# Patient Record
Sex: FEMALE | Race: WHITE | NOT HISPANIC OR LATINO | Employment: OTHER | URBAN - METROPOLITAN AREA
[De-identification: names, ages, dates, MRNs, and addresses within clinical notes are randomized per-mention and may not be internally consistent; named-entity substitution may affect disease eponyms.]

---

## 2023-10-23 PROBLEM — E55.9 VITAMIN D DEFICIENCY: Status: ACTIVE | Noted: 2023-10-23

## 2023-10-23 PROBLEM — I05.9 MITRAL VALVE DISEASE: Status: ACTIVE | Noted: 2023-10-23

## 2023-10-23 PROBLEM — R73.9 HYPERGLYCEMIA: Status: ACTIVE | Noted: 2023-10-23

## 2023-10-23 PROBLEM — M10.9 URIC ACID ARTHROPATHY: Status: ACTIVE | Noted: 2023-10-23

## 2023-10-23 PROBLEM — D72.819 LEUKOPENIA: Status: ACTIVE | Noted: 2023-10-23

## 2023-10-23 PROBLEM — Z95.2 S/P MVR (MITRAL VALVE REPLACEMENT): Status: ACTIVE | Noted: 2023-10-23

## 2023-10-23 PROBLEM — A49.8 PROTEUS MIRABILIS INFECTION: Status: ACTIVE | Noted: 2023-10-23

## 2023-10-23 PROBLEM — I48.91 AFIB (MULTI): Status: ACTIVE | Noted: 2023-10-23

## 2023-10-23 PROBLEM — R74.01 ELEVATED TRANSAMINASE LEVEL: Status: ACTIVE | Noted: 2023-10-23

## 2023-10-23 PROBLEM — S46.002A ROTATOR CUFF INJURY, LEFT, INITIAL ENCOUNTER: Status: ACTIVE | Noted: 2023-10-23

## 2023-10-23 PROBLEM — S92.901A CLOSED FRACTURE OF BONE OF RIGHT FOOT: Status: ACTIVE | Noted: 2023-10-23

## 2023-10-23 PROBLEM — I10 BENIGN HYPERTENSION: Status: ACTIVE | Noted: 2023-10-23

## 2023-10-23 PROBLEM — E66.3 OVERWEIGHT (BMI 25.0-29.9): Status: ACTIVE | Noted: 2023-10-23

## 2023-10-23 PROBLEM — I50.9 CHF (CONGESTIVE HEART FAILURE) (MULTI): Status: ACTIVE | Noted: 2023-10-23

## 2023-10-23 PROBLEM — D75.89 MACROCYTOSIS: Status: ACTIVE | Noted: 2023-10-23

## 2023-10-23 PROBLEM — H69.90 EUSTACHIAN TUBE DISORDER: Status: ACTIVE | Noted: 2023-10-23

## 2023-10-23 PROBLEM — E78.5 DYSLIPIDEMIA: Status: ACTIVE | Noted: 2023-10-23

## 2023-10-23 PROBLEM — D64.9 ANEMIA: Status: ACTIVE | Noted: 2023-10-23

## 2023-10-23 PROBLEM — R73.03 PREDIABETES: Status: ACTIVE | Noted: 2023-10-23

## 2023-10-23 PROBLEM — N34.2 INFECTIVE URETHRITIS: Status: ACTIVE | Noted: 2023-10-23

## 2023-10-23 PROBLEM — D70.4 CYCLICAL NEUTROPENIA (MULTI): Status: ACTIVE | Noted: 2023-10-23

## 2023-10-23 PROBLEM — D69.2 PURPURA (CMS-HCC): Status: ACTIVE | Noted: 2023-10-23

## 2023-10-23 RX ORDER — WARFARIN SODIUM 5 MG/1
1-2 TABLET ORAL
COMMUNITY
Start: 2021-01-15 | End: 2023-10-25 | Stop reason: SDUPTHER

## 2023-10-23 RX ORDER — BETAXOLOL 20 MG/1
1 TABLET, FILM COATED ORAL DAILY
COMMUNITY
Start: 2020-07-20 | End: 2023-10-25 | Stop reason: SDUPTHER

## 2023-10-23 RX ORDER — FUROSEMIDE 20 MG/1
20 TABLET ORAL DAILY
COMMUNITY

## 2023-10-25 ENCOUNTER — OFFICE VISIT (OUTPATIENT)
Dept: PRIMARY CARE | Facility: CLINIC | Age: 83
End: 2023-10-25
Payer: MEDICARE

## 2023-10-25 VITALS
BODY MASS INDEX: 26.13 KG/M2 | WEIGHT: 138.4 LBS | HEIGHT: 61 IN | SYSTOLIC BLOOD PRESSURE: 144 MMHG | HEART RATE: 73 BPM | OXYGEN SATURATION: 99 % | DIASTOLIC BLOOD PRESSURE: 68 MMHG

## 2023-10-25 DIAGNOSIS — Z00.00 HEALTHCARE MAINTENANCE: Primary | ICD-10-CM

## 2023-10-25 DIAGNOSIS — E78.5 DYSLIPIDEMIA: ICD-10-CM

## 2023-10-25 DIAGNOSIS — L03.116 CELLULITIS OF LEFT LOWER EXTREMITY: ICD-10-CM

## 2023-10-25 DIAGNOSIS — I48.91 ATRIAL FIBRILLATION, UNSPECIFIED TYPE (MULTI): ICD-10-CM

## 2023-10-25 PROCEDURE — G0008 ADMIN INFLUENZA VIRUS VAC: HCPCS | Performed by: STUDENT IN AN ORGANIZED HEALTH CARE EDUCATION/TRAINING PROGRAM

## 2023-10-25 PROCEDURE — 3078F DIAST BP <80 MM HG: CPT | Performed by: STUDENT IN AN ORGANIZED HEALTH CARE EDUCATION/TRAINING PROGRAM

## 2023-10-25 PROCEDURE — 1170F FXNL STATUS ASSESSED: CPT | Performed by: STUDENT IN AN ORGANIZED HEALTH CARE EDUCATION/TRAINING PROGRAM

## 2023-10-25 PROCEDURE — 3077F SYST BP >= 140 MM HG: CPT | Performed by: STUDENT IN AN ORGANIZED HEALTH CARE EDUCATION/TRAINING PROGRAM

## 2023-10-25 PROCEDURE — G0439 PPPS, SUBSEQ VISIT: HCPCS | Performed by: STUDENT IN AN ORGANIZED HEALTH CARE EDUCATION/TRAINING PROGRAM

## 2023-10-25 PROCEDURE — 99214 OFFICE O/P EST MOD 30 MIN: CPT | Performed by: STUDENT IN AN ORGANIZED HEALTH CARE EDUCATION/TRAINING PROGRAM

## 2023-10-25 PROCEDURE — 1036F TOBACCO NON-USER: CPT | Performed by: STUDENT IN AN ORGANIZED HEALTH CARE EDUCATION/TRAINING PROGRAM

## 2023-10-25 PROCEDURE — 90686 IIV4 VACC NO PRSV 0.5 ML IM: CPT | Performed by: STUDENT IN AN ORGANIZED HEALTH CARE EDUCATION/TRAINING PROGRAM

## 2023-10-25 PROCEDURE — 1159F MED LIST DOCD IN RCRD: CPT | Performed by: STUDENT IN AN ORGANIZED HEALTH CARE EDUCATION/TRAINING PROGRAM

## 2023-10-25 RX ORDER — AMOXICILLIN AND CLAVULANATE POTASSIUM 875; 125 MG/1; MG/1
875 TABLET, FILM COATED ORAL 2 TIMES DAILY
Qty: 20 TABLET | Refills: 0 | Status: SHIPPED | OUTPATIENT
Start: 2023-10-25 | End: 2023-11-07 | Stop reason: SDUPTHER

## 2023-10-25 RX ORDER — BETAXOLOL 20 MG/1
1 TABLET, FILM COATED ORAL DAILY
Qty: 90 TABLET | Refills: 1 | Status: SHIPPED | OUTPATIENT
Start: 2023-10-25

## 2023-10-25 RX ORDER — WARFARIN SODIUM 5 MG/1
10 TABLET ORAL ONCE
Qty: 180 TABLET | Refills: 1 | Status: SHIPPED | OUTPATIENT
Start: 2023-10-25 | End: 2023-11-07

## 2023-10-25 ASSESSMENT — PATIENT HEALTH QUESTIONNAIRE - PHQ9
2. FEELING DOWN, DEPRESSED OR HOPELESS: NOT AT ALL
SUM OF ALL RESPONSES TO PHQ9 QUESTIONS 1 AND 2: 0
1. LITTLE INTEREST OR PLEASURE IN DOING THINGS: NOT AT ALL

## 2023-10-25 ASSESSMENT — ENCOUNTER SYMPTOMS
SHORTNESS OF BREATH: 0
VOMITING: 0
EYE DISCHARGE: 0
HEMATURIA: 0
SORE THROAT: 0
CONSTIPATION: 0
DIARRHEA: 0
WHEEZING: 0
EYE PAIN: 0
LOSS OF SENSATION IN FEET: 0
COUGH: 0
ABDOMINAL PAIN: 0
DEPRESSION: 0
APPETITE CHANGE: 0
HALLUCINATIONS: 0
PALPITATIONS: 0
POLYDIPSIA: 0
FREQUENCY: 0
DYSURIA: 0
MYALGIAS: 0
OCCASIONAL FEELINGS OF UNSTEADINESS: 0
FATIGUE: 0
HEADACHES: 0
FEVER: 0
NAUSEA: 0

## 2023-10-25 ASSESSMENT — ACTIVITIES OF DAILY LIVING (ADL)
DRESSING: INDEPENDENT
DOING_HOUSEWORK: INDEPENDENT
BATHING: INDEPENDENT
GROCERY_SHOPPING: INDEPENDENT
TAKING_MEDICATION: INDEPENDENT
MANAGING_FINANCES: NEEDS ASSISTANCE

## 2023-10-25 NOTE — PROGRESS NOTES
Subjective   Reason for Visit: Kymberly Layne is an 83 y.o. female here for a Medicare Wellness visit.     Past Medical, Surgical, and Family History reviewed and updated in chart.    Reviewed all medications by prescribing practitioner or clinical pharmacist (such as prescriptions, OTCs, herbal therapies and supplements) and documented in the medical record.    HPI  Cancer:   -Menopause age: 50yo  -Colon (Age > 50): Refuses  -PAP (Age > 20): Refuses  -Breast (Age > 40): Refuses  -Lung (Age 55-80, 30 pack year, cessation within 15 years): Never smoker    Vaccination  Tetnus: Up to date; Next 2025  Shingle (Age > 50): Refuses  Pneumonia 13 & 23 (Age 65): Up to date  Flu: Due    Osteoporosis screening (Age > 59 or Fragility fracture): Refuses    Tobacco: Never smoker  Alcohol: Denies  Diabetes: Denies  Lipids: Denies  Cognitive: Denies any Cognitive deficits. No cognitive deficits noted.     Patient also has history of atrial fibrillation, hypertension, hyperlipidemia.  Has been tolerating her medications well.  Denies any adverse reaction.  Sees a Coumadin clinic for her INR checks.  Her only concern is bilateral lower extremity edema along with redness newly on her left lower extremity.  Has been there roughly 1 week.  Seems to be getting slightly worse.  Afebrile course.  Denies any trauma or falls.  However feels like it is different than her usual leg swelling.    Patient Care Team:  Cornelio Park DO as PCP - General  Cornelio Park DO as PCP - [employer] PCP     Review of Systems   Constitutional:  Negative for appetite change, fatigue and fever.   HENT:  Negative for congestion, ear discharge, ear pain, hearing loss and sore throat.    Eyes:  Negative for pain and discharge.   Respiratory:  Negative for cough, shortness of breath and wheezing.    Cardiovascular:  Positive for leg swelling. Negative for chest pain and palpitations.   Gastrointestinal:  Negative for abdominal pain, constipation,  "diarrhea, nausea and vomiting.   Endocrine: Negative for cold intolerance, heat intolerance and polydipsia.   Genitourinary:  Negative for dysuria, frequency and hematuria.   Musculoskeletal:  Negative for gait problem and myalgias.   Skin:  Negative for rash.   Neurological:  Negative for syncope and headaches.   Psychiatric/Behavioral:  Negative for hallucinations and suicidal ideas.        Objective   Vitals:  /68   Pulse 73   Ht 1.556 m (5' 1.25\")   Wt 62.8 kg (138 lb 6.4 oz)   SpO2 99%   BMI 25.94 kg/m²       Physical Exam  Constitutional:       Appearance: Normal appearance.   HENT:      Head: Normocephalic and atraumatic.      Right Ear: External ear normal.      Left Ear: External ear normal.      Nose: Nose normal.      Mouth/Throat:      Mouth: Mucous membranes are moist.   Eyes:      Extraocular Movements: Extraocular movements intact.      Conjunctiva/sclera: Conjunctivae normal.      Pupils: Pupils are equal, round, and reactive to light.   Cardiovascular:      Rate and Rhythm: Normal rate and regular rhythm.      Pulses: Normal pulses.      Heart sounds: Normal heart sounds.      Comments: Erythema and warmth noted on the left lower extremity with swelling bilaterally.  +2 pitting edema bilaterally  Pulmonary:      Effort: Pulmonary effort is normal.      Breath sounds: Normal breath sounds.   Abdominal:      General: Abdomen is flat.      Palpations: Abdomen is soft.   Neurological:      General: No focal deficit present.      Mental Status: She is alert and oriented to person, place, and time.   Psychiatric:         Mood and Affect: Mood normal.         Behavior: Behavior normal.         Assessment/Plan   Preventative screenings as stated above.  We will review blood work once received.  States that she did in [facility].  We will have her sign a release form today.  Continue current medication regimen.  Start the patient on oral antibiotics.  Compression stockings.  If not " better by next week to return for reevaluation.  Problem List Items Addressed This Visit    None

## 2023-11-07 ENCOUNTER — OFFICE VISIT (OUTPATIENT)
Dept: PRIMARY CARE | Facility: CLINIC | Age: 83
End: 2023-11-07
Payer: MEDICARE

## 2023-11-07 VITALS
OXYGEN SATURATION: 95 % | DIASTOLIC BLOOD PRESSURE: 68 MMHG | HEART RATE: 74 BPM | HEIGHT: 61 IN | BODY MASS INDEX: 26.55 KG/M2 | SYSTOLIC BLOOD PRESSURE: 122 MMHG | WEIGHT: 140.6 LBS

## 2023-11-07 DIAGNOSIS — L03.116 CELLULITIS OF LEFT LOWER EXTREMITY: ICD-10-CM

## 2023-11-07 PROCEDURE — 3078F DIAST BP <80 MM HG: CPT | Performed by: STUDENT IN AN ORGANIZED HEALTH CARE EDUCATION/TRAINING PROGRAM

## 2023-11-07 PROCEDURE — 3074F SYST BP LT 130 MM HG: CPT | Performed by: STUDENT IN AN ORGANIZED HEALTH CARE EDUCATION/TRAINING PROGRAM

## 2023-11-07 PROCEDURE — 1159F MED LIST DOCD IN RCRD: CPT | Performed by: STUDENT IN AN ORGANIZED HEALTH CARE EDUCATION/TRAINING PROGRAM

## 2023-11-07 PROCEDURE — 99213 OFFICE O/P EST LOW 20 MIN: CPT | Performed by: STUDENT IN AN ORGANIZED HEALTH CARE EDUCATION/TRAINING PROGRAM

## 2023-11-07 PROCEDURE — 1036F TOBACCO NON-USER: CPT | Performed by: STUDENT IN AN ORGANIZED HEALTH CARE EDUCATION/TRAINING PROGRAM

## 2023-11-07 RX ORDER — AMOXICILLIN AND CLAVULANATE POTASSIUM 875; 125 MG/1; MG/1
875 TABLET, FILM COATED ORAL 2 TIMES DAILY
Qty: 28 TABLET | Refills: 0 | Status: SHIPPED | OUTPATIENT
Start: 2023-11-07 | End: 2023-11-21

## 2023-11-07 ASSESSMENT — ENCOUNTER SYMPTOMS
VOMITING: 0
NAUSEA: 0
APPETITE CHANGE: 0
PALPITATIONS: 0
HEMATURIA: 0
ABDOMINAL PAIN: 0
WHEEZING: 0
HALLUCINATIONS: 0
FATIGUE: 0
DIARRHEA: 0
FREQUENCY: 0
FEVER: 0
SHORTNESS OF BREATH: 0
EYE PAIN: 0
SORE THROAT: 0
MYALGIAS: 0
CONSTIPATION: 0
DYSURIA: 0
EYE DISCHARGE: 0
HEADACHES: 0
POLYDIPSIA: 0
COUGH: 0

## 2023-11-07 NOTE — PROGRESS NOTES
"Subjective   Patient ID: Kymberly Layne is a 83 y.o. female who presents for recheck left leg.    HPI   Patient here for follow-up of cellulitis of left lower extremity.  Patient and daughter is present for the visit and states that it does look significant better.  Daughter does present pictures of initial onset.  States that it did look significantly worse.  Has been using a moisturizer on her leg as well.  Completely out of antibiotics at this point in time.  No other concerns today.  Afebrile course.  Denies any chest pain or shortness of breath.  Patient does have some concerns because it still remains erythematous.    Review of Systems   Constitutional:  Negative for appetite change, fatigue and fever.   HENT:  Negative for congestion, ear discharge, ear pain, hearing loss and sore throat.    Eyes:  Negative for pain and discharge.   Respiratory:  Negative for cough, shortness of breath and wheezing.    Cardiovascular:  Negative for chest pain, palpitations and leg swelling.   Gastrointestinal:  Negative for abdominal pain, constipation, diarrhea, nausea and vomiting.   Endocrine: Negative for cold intolerance, heat intolerance and polydipsia.   Genitourinary:  Negative for dysuria, frequency and hematuria.   Musculoskeletal:  Negative for gait problem and myalgias.   Skin:  Positive for rash.   Neurological:  Negative for syncope and headaches.   Psychiatric/Behavioral:  Negative for hallucinations and suicidal ideas.        Objective   /68   Pulse 74   Ht 1.549 m (5' 1\")   Wt 63.8 kg (140 lb 9.6 oz)   SpO2 95%   BMI 26.57 kg/m²     Physical Exam  Constitutional:       Appearance: Normal appearance.   HENT:      Head: Normocephalic and atraumatic.      Right Ear: External ear normal.      Left Ear: External ear normal.      Nose: Nose normal.      Mouth/Throat:      Mouth: Mucous membranes are moist.   Eyes:      Extraocular Movements: Extraocular movements intact.      Conjunctiva/sclera: " Conjunctivae normal.      Pupils: Pupils are equal, round, and reactive to light.   Cardiovascular:      Rate and Rhythm: Normal rate and regular rhythm.      Pulses: Normal pulses.      Heart sounds: Normal heart sounds.   Pulmonary:      Effort: Pulmonary effort is normal.      Breath sounds: Normal breath sounds.   Abdominal:      General: Abdomen is flat.      Palpations: Abdomen is soft.   Skin:     Comments: Mildly warm erythematous lower left lower extremity..  Nontender to the touch.  Mild 2+ edema of the left lower extremity.   Neurological:      General: No focal deficit present.      Mental Status: She is alert and oriented to person, place, and time.   Psychiatric:         Mood and Affect: Mood normal.         Behavior: Behavior normal.         Assessment/Plan   Looks significantly improved since previously seen.  Comparatively to the image given by the daughter does look a lot better.  However is still remains erythematous.  We will continue another 2 weeks of the current antibiotic regimen.  We will follow-up shortly after for resolution.

## 2023-11-21 ENCOUNTER — TELEPHONE (OUTPATIENT)
Dept: PRIMARY CARE | Facility: CLINIC | Age: 83
End: 2023-11-21
Payer: MEDICARE

## 2023-11-21 DIAGNOSIS — R09.89 OTHER SPECIFIED SYMPTOMS AND SIGNS INVOLVING THE CIRCULATORY AND RESPIRATORY SYSTEMS: ICD-10-CM

## 2023-11-21 DIAGNOSIS — M79.89 LEG SWELLING: Primary | ICD-10-CM

## 2023-11-22 DIAGNOSIS — M79.89 LEG SWELLING: Primary | ICD-10-CM

## 2023-11-22 DIAGNOSIS — R09.89 SWOLLEN VEIN: ICD-10-CM

## 2023-11-28 ENCOUNTER — HOSPITAL ENCOUNTER (OUTPATIENT)
Dept: CARDIOLOGY | Facility: CLINIC | Age: 83
Discharge: HOME | End: 2023-11-28
Payer: MEDICARE

## 2023-11-28 DIAGNOSIS — R09.89 SWOLLEN VEIN: ICD-10-CM

## 2023-11-28 DIAGNOSIS — M79.89 LEG SWELLING: ICD-10-CM

## 2023-11-28 PROCEDURE — 93970 EXTREMITY STUDY: CPT

## 2023-11-28 PROCEDURE — 93925 LOWER EXTREMITY STUDY: CPT

## 2023-11-28 PROCEDURE — 93925 LOWER EXTREMITY STUDY: CPT | Performed by: INTERNAL MEDICINE

## 2023-11-28 PROCEDURE — 93970 EXTREMITY STUDY: CPT | Performed by: INTERNAL MEDICINE

## 2023-12-01 ENCOUNTER — TELEPHONE (OUTPATIENT)
Dept: PRIMARY CARE | Facility: CLINIC | Age: 83
End: 2023-12-01
Payer: MEDICARE

## 2023-12-01 PROBLEM — N39.9 UROLOGIC DISORDER: Status: ACTIVE | Noted: 2023-12-01

## 2023-12-01 PROBLEM — Z79.01 ANTICOAGULATED BY ANTICOAGULATION TREATMENT: Status: ACTIVE | Noted: 2023-12-01

## 2023-12-08 ENCOUNTER — TELEPHONE (OUTPATIENT)
Dept: PRIMARY CARE | Facility: CLINIC | Age: 83
End: 2023-12-08
Payer: MEDICARE

## 2023-12-08 NOTE — TELEPHONE ENCOUNTER
----- Message from Cornelio Park DO sent at 11/28/2023  8:12 PM EST -----  Please call the patient regarding her abnormal result. Mild right vascular disease. Has the leg gotten any worse?

## 2023-12-11 DIAGNOSIS — M79.89 LEFT LEG SWELLING: ICD-10-CM

## 2023-12-11 DIAGNOSIS — L30.9 DERMATITIS: Primary | ICD-10-CM

## 2024-01-10 ENCOUNTER — OFFICE VISIT (OUTPATIENT)
Dept: DERMATOLOGY | Facility: CLINIC | Age: 84
End: 2024-01-10
Payer: MEDICARE

## 2024-01-10 DIAGNOSIS — I87.2 VENOUS STASIS DERMATITIS OF BOTH LOWER EXTREMITIES: Primary | ICD-10-CM

## 2024-01-10 PROCEDURE — 1036F TOBACCO NON-USER: CPT | Performed by: STUDENT IN AN ORGANIZED HEALTH CARE EDUCATION/TRAINING PROGRAM

## 2024-01-10 PROCEDURE — 99204 OFFICE O/P NEW MOD 45 MIN: CPT | Performed by: STUDENT IN AN ORGANIZED HEALTH CARE EDUCATION/TRAINING PROGRAM

## 2024-01-10 PROCEDURE — 1159F MED LIST DOCD IN RCRD: CPT | Performed by: STUDENT IN AN ORGANIZED HEALTH CARE EDUCATION/TRAINING PROGRAM

## 2024-01-10 RX ORDER — TRIAMCINOLONE ACETONIDE 1 MG/G
CREAM TOPICAL
Qty: 80 G | Refills: 3 | Status: SHIPPED | OUTPATIENT
Start: 2024-01-10 | End: 2024-04-11 | Stop reason: SDUPTHER

## 2024-01-10 NOTE — PROGRESS NOTES
Subjective   Kymberly Layne is a 83 y.o. female who presents for the following: Dermatitis (Referred by Dr. Cornelio Park for dermatitis.  Present on left lower leg x approximately 1 year.  Red and warm now, previously flaky.  No treatment. ).      Objective   Well appearing patient in no apparent distress; mood and affect are within normal limits.    A focused examination was performed including lower extremities, including the legs, feet, toes, and toenails. All findings within normal limits unless otherwise noted below.    Left Lower Leg - Anterior, Right Lower Leg - Anterior  Erythematous edematous thin plaques on b/l lower legs, L>R, about intermediate up legs, in background of hyperpigmented patches, and pitting 1+ edema intermediate up legs. Some varicose veins noted.       Assessment/Plan   Venous stasis dermatitis of both lower extremities  Left Lower Leg - Anterior; Right Lower Leg - Anterior    Stasis dermatitis   -Reviewed in detail nature and etiology of condition   -Advised to start wearing compression stockings 15-20 mmHg daily. She states she tried compression in the past (unsure strength), and could not tolerate. Advised to check the strength of what she used in past, and go lower than that to start, even as low as 8mmHg or less if that's what she used in past. Advised that starting with very little compression can be helpful to get more comfortable to the feel of pressure, and perhaps slowly get some edema down, potentially making it more comfortable in the future to go back up to higher strengths. She voiced understanding and willingness to try.     Apply in the morning, wear all-day, then remove at bedtime. At bedtime, can apply TAC 0.1% cream x 2-3 weeks prn for dermatitis flares. May also apply in the AM if not bothered by application under socks.     -RTC 3 months      triamcinolone (Kenalog) 0.1 % cream - Left Lower Leg - Anterior, Right Lower Leg - Anterior  Apply to affected areas on body twice daily  until clear.          Scribe Attestation  By signing my name below, I, Juany Still LPN , Scribe   attest that this documentation has been prepared under the direction and in the presence of Cain Ellison MD.

## 2024-01-23 PROBLEM — D17.21 LIPOMA OF RIGHT UPPER EXTREMITY: Status: ACTIVE | Noted: 2024-01-23

## 2024-01-23 PROBLEM — M25.512 PAIN OF LEFT SHOULDER REGION: Status: ACTIVE | Noted: 2024-01-23

## 2024-01-23 PROBLEM — M79.673 PAIN OF FOOT: Status: ACTIVE | Noted: 2024-01-23

## 2024-01-23 PROBLEM — M10.9 ARTICULAR GOUT: Status: ACTIVE | Noted: 2023-10-23

## 2024-01-23 PROBLEM — L03.116 CELLULITIS OF LEFT LOWER EXTREMITY: Status: ACTIVE | Noted: 2023-10-25

## 2024-01-23 PROBLEM — M79.89 SWELLING OF LOWER EXTREMITY: Status: ACTIVE | Noted: 2024-01-23

## 2024-01-23 PROBLEM — R53.83 FATIGUE: Status: ACTIVE | Noted: 2024-01-23

## 2024-01-23 PROBLEM — Z20.822 EXPOSURE TO SEVERE ACUTE RESPIRATORY SYNDROME CORONAVIRUS 2 (SARS-COV-2): Status: ACTIVE | Noted: 2024-01-23

## 2024-01-23 PROBLEM — J30.9 ALLERGIC RHINITIS: Status: ACTIVE | Noted: 2024-01-23

## 2024-01-23 PROBLEM — R09.89: Status: ACTIVE | Noted: 2024-01-23

## 2024-01-25 ENCOUNTER — OFFICE VISIT (OUTPATIENT)
Dept: PRIMARY CARE | Facility: CLINIC | Age: 84
End: 2024-01-25
Payer: MEDICARE

## 2024-01-25 VITALS
HEART RATE: 70 BPM | WEIGHT: 136 LBS | DIASTOLIC BLOOD PRESSURE: 79 MMHG | SYSTOLIC BLOOD PRESSURE: 153 MMHG | BODY MASS INDEX: 25.68 KG/M2 | TEMPERATURE: 98 F | HEIGHT: 61 IN

## 2024-01-25 DIAGNOSIS — I50.32 CHRONIC DIASTOLIC CONGESTIVE HEART FAILURE (MULTI): ICD-10-CM

## 2024-01-25 DIAGNOSIS — R73.03 PREDIABETES: ICD-10-CM

## 2024-01-25 DIAGNOSIS — E55.9 VITAMIN D DEFICIENCY: ICD-10-CM

## 2024-01-25 DIAGNOSIS — Z78.0 MENOPAUSE PRESENT: Primary | ICD-10-CM

## 2024-01-25 DIAGNOSIS — D64.9 ANEMIA, UNSPECIFIED TYPE: ICD-10-CM

## 2024-01-25 DIAGNOSIS — Z95.2 S/P MVR (MITRAL VALVE REPLACEMENT): ICD-10-CM

## 2024-01-25 DIAGNOSIS — I10 BENIGN HYPERTENSION: ICD-10-CM

## 2024-01-25 DIAGNOSIS — I48.11 LONGSTANDING PERSISTENT ATRIAL FIBRILLATION (MULTI): ICD-10-CM

## 2024-01-25 PROCEDURE — 99213 OFFICE O/P EST LOW 20 MIN: CPT | Performed by: INTERNAL MEDICINE

## 2024-01-25 PROCEDURE — 1160F RVW MEDS BY RX/DR IN RCRD: CPT | Performed by: INTERNAL MEDICINE

## 2024-01-25 PROCEDURE — 3078F DIAST BP <80 MM HG: CPT | Performed by: INTERNAL MEDICINE

## 2024-01-25 PROCEDURE — 1036F TOBACCO NON-USER: CPT | Performed by: INTERNAL MEDICINE

## 2024-01-25 PROCEDURE — 1159F MED LIST DOCD IN RCRD: CPT | Performed by: INTERNAL MEDICINE

## 2024-01-25 PROCEDURE — 3077F SYST BP >= 140 MM HG: CPT | Performed by: INTERNAL MEDICINE

## 2024-01-25 ASSESSMENT — ENCOUNTER SYMPTOMS
FEVER: 0
LOSS OF SENSATION IN FEET: 0
SORE THROAT: 0
OCCASIONAL FEELINGS OF UNSTEADINESS: 0
VOMITING: 0
DIZZINESS: 0
COUGH: 0
ABDOMINAL PAIN: 0
SHORTNESS OF BREATH: 0
CHILLS: 0
BLOOD IN STOOL: 0
DEPRESSION: 0
LIGHT-HEADEDNESS: 0
NAUSEA: 0
HEMATURIA: 0
PALPITATIONS: 0

## 2024-01-25 ASSESSMENT — PATIENT HEALTH QUESTIONNAIRE - PHQ9
1. LITTLE INTEREST OR PLEASURE IN DOING THINGS: NOT AT ALL
SUM OF ALL RESPONSES TO PHQ9 QUESTIONS 1 AND 2: 0
2. FEELING DOWN, DEPRESSED OR HOPELESS: NOT AT ALL

## 2024-01-25 NOTE — PROGRESS NOTES
"Subjective   Patient ID: Kymberly Layne is a 83 y.o. female who presents for Establish Care.    HPI Patient's family doctor recently moved and she is here to establish care. Patient goes to the coumadin clinic at [facility] and they manage her coumadin.  Last INR ws 3.2 and she has been adjusted according.  Patient is not seeing a cardiologist regularly. Patient last saw her cardiologist over 2 years ago. Denies CP, SOB, lightheadedness, dizziness, syncope. Denies blood in the stool or urine.     Patient has been following with urology for kidney stones.  States she has a follow-up scheduled with urology for removal of a ureter stent.  She also follows with dermatology for venous stasis dermatitis.     Review of Systems   Constitutional:  Negative for chills and fever.   HENT:  Negative for sore throat.    Eyes:  Negative for visual disturbance.   Respiratory:  Negative for cough and shortness of breath.    Cardiovascular:  Negative for chest pain and palpitations.   Gastrointestinal:  Negative for abdominal pain, blood in stool, nausea and vomiting.   Genitourinary:  Negative for hematuria.   Skin:  Negative for rash.   Neurological:  Negative for dizziness, syncope and light-headedness.       Objective   /79   Pulse 70   Temp 36.7 °C (98 °F) (Temporal)   Ht 1.549 m (5' 1\")   Wt 61.7 kg (136 lb)   BMI 25.70 kg/m²     Physical Exam  Vitals and nursing note reviewed.   Constitutional:       General: She is not in acute distress.     Appearance: Normal appearance. She is not ill-appearing or toxic-appearing.   HENT:      Head: Normocephalic and atraumatic.      Mouth/Throat:      Mouth: Mucous membranes are moist.      Pharynx: Oropharynx is clear. No oropharyngeal exudate.   Eyes:      Extraocular Movements: Extraocular movements intact.      Pupils: Pupils are equal, round, and reactive to light.   Cardiovascular:      Rate and Rhythm: Normal rate. Rhythm irregular.      Heart sounds: Murmur heard. "   Pulmonary:      Effort: Pulmonary effort is normal. No respiratory distress.      Breath sounds: Normal breath sounds. No stridor. No wheezing.   Musculoskeletal:      Cervical back: Neck supple. No tenderness.   Lymphadenopathy:      Cervical: No cervical adenopathy.   Skin:     General: Skin is warm and dry.   Neurological:      General: No focal deficit present.      Mental Status: She is alert and oriented to person, place, and time.   Psychiatric:         Mood and Affect: Mood normal.         Behavior: Behavior normal.         Thought Content: Thought content normal.         Assessment/Plan   Problem List Items Addressed This Visit             ICD-10-CM    Afib (CMS/[facility]) I48.91    Anemia D64.9    Relevant Orders    CBC and Auto Differential    Vitamin B12    Ferritin    Iron and TIBC    Benign hypertension I10    Relevant Orders    Comprehensive metabolic panel    CHF (congestive heart failure) (CMS/[facility]) I50.9    Vitamin D deficiency E55.9    Relevant Orders    Vitamin D 25-Hydroxy,Total (for eval of Vitamin D levels)    S/P MVR (mitral valve replacement) Z95.2    Prediabetes R73.03    Relevant Orders    Hemoglobin A1C    Menopause present - Primary Z78.0    Relevant Orders    XR DEXA bone density     Atrial fibrillation: Chronic, stable she remains on beta-blocker therapy for rate control as well as anticoagulation.  No blood in the stool or urine.  Patient has a cardiologist and I advised her to follow-up with cardiology once yearly    Anemia will continue to monitor.  Will check CBC, vit B12, ferritin and iron levels.  She defers colonoscopy.    Hypertension: Blood pressure elevated in the office today we discussed monitoring blood pressures at home, low-sodium diet.  Blood pressure was normal during last office visit.  Follow-up in 3 months for recheck    Diastolic heart failure: Chronic, stable.  Volume status is good.  No evidence of fluid overload lungs are clear no JVD she uses Lasix as needed for  swelling.  I have advised the patient to follow-up with cardiology at least once yearly    Vitamin D deficiency: We will check a vitamin D level    Mitral valve disease-status post mitral valve replacement: I advised the patient to follow-up with cardiology at least once yearly.

## 2024-01-29 ENCOUNTER — TELEPHONE (OUTPATIENT)
Dept: PRIMARY CARE | Facility: CLINIC | Age: 84
End: 2024-01-29
Payer: MEDICARE

## 2024-01-29 NOTE — TELEPHONE ENCOUNTER
Patient should hold coumadin 5 days prior to her procedure. She should follow-up with the coumadin clinic after her procedure for dosing of the medication to resume the coumadin and how often to have rechecks with resuming coumadin.

## 2024-01-29 NOTE — TELEPHONE ENCOUNTER
Would like to know when to stop and start coumadin for surgery.  Are you going to manage coumadin?     Daughter Lacey beverly [phone]

## 2024-03-06 ENCOUNTER — LAB (OUTPATIENT)
Dept: LAB | Facility: LAB | Age: 84
End: 2024-03-06
Payer: MEDICARE

## 2024-03-06 ENCOUNTER — HOSPITAL ENCOUNTER (OUTPATIENT)
Dept: RADIOLOGY | Facility: CLINIC | Age: 84
Discharge: HOME | End: 2024-03-06
Payer: MEDICARE

## 2024-03-06 DIAGNOSIS — D64.9 ANEMIA, UNSPECIFIED TYPE: ICD-10-CM

## 2024-03-06 DIAGNOSIS — R73.03 PREDIABETES: ICD-10-CM

## 2024-03-06 DIAGNOSIS — Z78.0 MENOPAUSE PRESENT: ICD-10-CM

## 2024-03-06 DIAGNOSIS — E55.9 VITAMIN D DEFICIENCY: ICD-10-CM

## 2024-03-06 DIAGNOSIS — I10 BENIGN HYPERTENSION: ICD-10-CM

## 2024-03-06 LAB
25(OH)D3 SERPL-MCNC: 81 NG/ML (ref 30–100)
ALBUMIN SERPL BCP-MCNC: 4.1 G/DL (ref 3.4–5)
ALP SERPL-CCNC: 70 U/L (ref 33–136)
ALT SERPL W P-5'-P-CCNC: 12 U/L (ref 7–45)
ANION GAP SERPL CALC-SCNC: 12 MMOL/L (ref 10–20)
AST SERPL W P-5'-P-CCNC: 24 U/L (ref 9–39)
BASOPHILS # BLD AUTO: 0.02 X10*3/UL (ref 0–0.1)
BASOPHILS NFR BLD AUTO: 0.5 %
BILIRUB SERPL-MCNC: 0.8 MG/DL (ref 0–1.2)
BUN SERPL-MCNC: 23 MG/DL (ref 6–23)
CALCIUM SERPL-MCNC: 9.8 MG/DL (ref 8.6–10.6)
CHLORIDE SERPL-SCNC: 103 MMOL/L (ref 98–107)
CO2 SERPL-SCNC: 29 MMOL/L (ref 21–32)
CREAT SERPL-MCNC: 0.79 MG/DL (ref 0.5–1.05)
EGFRCR SERPLBLD CKD-EPI 2021: 75 ML/MIN/1.73M*2
EOSINOPHIL # BLD AUTO: 0.12 X10*3/UL (ref 0–0.4)
EOSINOPHIL NFR BLD AUTO: 2.8 %
ERYTHROCYTE [DISTWIDTH] IN BLOOD BY AUTOMATED COUNT: 12.7 % (ref 11.5–14.5)
EST. AVERAGE GLUCOSE BLD GHB EST-MCNC: 111 MG/DL
FERRITIN SERPL-MCNC: 171 NG/ML (ref 8–150)
GLUCOSE SERPL-MCNC: 118 MG/DL (ref 74–99)
HBA1C MFR BLD: 5.5 %
HCT VFR BLD AUTO: 35.2 % (ref 36–46)
HGB BLD-MCNC: 11.5 G/DL (ref 12–16)
IMM GRANULOCYTES # BLD AUTO: 0.01 X10*3/UL (ref 0–0.5)
IMM GRANULOCYTES NFR BLD AUTO: 0.2 % (ref 0–0.9)
IRON SATN MFR SERPL: 33 % (ref 25–45)
IRON SERPL-MCNC: 106 UG/DL (ref 35–150)
LYMPHOCYTES # BLD AUTO: 1.3 X10*3/UL (ref 0.8–3)
LYMPHOCYTES NFR BLD AUTO: 30.6 %
MCH RBC QN AUTO: 34 PG (ref 26–34)
MCHC RBC AUTO-ENTMCNC: 32.7 G/DL (ref 32–36)
MCV RBC AUTO: 104 FL (ref 80–100)
MONOCYTES # BLD AUTO: 0.3 X10*3/UL (ref 0.05–0.8)
MONOCYTES NFR BLD AUTO: 7.1 %
NEUTROPHILS # BLD AUTO: 2.5 X10*3/UL (ref 1.6–5.5)
NEUTROPHILS NFR BLD AUTO: 58.8 %
NRBC BLD-RTO: 0 /100 WBCS (ref 0–0)
PLATELET # BLD AUTO: 196 X10*3/UL (ref 150–450)
POTASSIUM SERPL-SCNC: 4.4 MMOL/L (ref 3.5–5.3)
PROT SERPL-MCNC: 7.1 G/DL (ref 6.4–8.2)
RBC # BLD AUTO: 3.38 X10*6/UL (ref 4–5.2)
SODIUM SERPL-SCNC: 140 MMOL/L (ref 136–145)
TIBC SERPL-MCNC: 322 UG/DL (ref 240–445)
UIBC SERPL-MCNC: 216 UG/DL (ref 110–370)
VIT B12 SERPL-MCNC: 417 PG/ML (ref 211–911)
WBC # BLD AUTO: 4.3 X10*3/UL (ref 4.4–11.3)

## 2024-03-06 PROCEDURE — 83036 HEMOGLOBIN GLYCOSYLATED A1C: CPT

## 2024-03-06 PROCEDURE — 36415 COLL VENOUS BLD VENIPUNCTURE: CPT

## 2024-03-06 PROCEDURE — 80053 COMPREHEN METABOLIC PANEL: CPT

## 2024-03-06 PROCEDURE — 82306 VITAMIN D 25 HYDROXY: CPT

## 2024-03-06 PROCEDURE — 83550 IRON BINDING TEST: CPT

## 2024-03-06 PROCEDURE — 83540 ASSAY OF IRON: CPT

## 2024-03-06 PROCEDURE — 85025 COMPLETE CBC W/AUTO DIFF WBC: CPT

## 2024-03-06 PROCEDURE — 82728 ASSAY OF FERRITIN: CPT

## 2024-03-06 PROCEDURE — 82607 VITAMIN B-12: CPT

## 2024-03-07 ENCOUNTER — HOSPITAL ENCOUNTER (OUTPATIENT)
Dept: RADIOLOGY | Facility: CLINIC | Age: 84
Discharge: HOME | End: 2024-03-07
Payer: MEDICARE

## 2024-03-07 ENCOUNTER — PATIENT MESSAGE (OUTPATIENT)
Dept: PRIMARY CARE | Facility: CLINIC | Age: 84
End: 2024-03-07

## 2024-03-07 PROCEDURE — 77080 DXA BONE DENSITY AXIAL: CPT | Performed by: STUDENT IN AN ORGANIZED HEALTH CARE EDUCATION/TRAINING PROGRAM

## 2024-03-07 PROCEDURE — 77080 DXA BONE DENSITY AXIAL: CPT

## 2024-03-07 NOTE — RESULT ENCOUNTER NOTE
Labs showed mild anemia but improved and mild elevation of fasting sugar otherwise labs normal and/or stable.

## 2024-04-11 ENCOUNTER — OFFICE VISIT (OUTPATIENT)
Dept: DERMATOLOGY | Facility: CLINIC | Age: 84
End: 2024-04-11
Payer: MEDICARE

## 2024-04-11 DIAGNOSIS — I87.2 VENOUS STASIS DERMATITIS OF BOTH LOWER EXTREMITIES: ICD-10-CM

## 2024-04-11 PROCEDURE — 1159F MED LIST DOCD IN RCRD: CPT | Performed by: STUDENT IN AN ORGANIZED HEALTH CARE EDUCATION/TRAINING PROGRAM

## 2024-04-11 PROCEDURE — 99213 OFFICE O/P EST LOW 20 MIN: CPT | Performed by: STUDENT IN AN ORGANIZED HEALTH CARE EDUCATION/TRAINING PROGRAM

## 2024-04-11 PROCEDURE — 1160F RVW MEDS BY RX/DR IN RCRD: CPT | Performed by: STUDENT IN AN ORGANIZED HEALTH CARE EDUCATION/TRAINING PROGRAM

## 2024-04-11 RX ORDER — TRIAMCINOLONE ACETONIDE 1 MG/G
CREAM TOPICAL
Qty: 80 G | Refills: 5 | Status: SHIPPED | OUTPATIENT
Start: 2024-04-11

## 2024-04-11 NOTE — PROGRESS NOTES
Subjective   Kymberly Layne is a 83 y.o. female who presents for the following: stasis dermatitis (LV: 1/10/24.  Notes improvement in legs.  Currently wearing compression stockings daily and Triamcinolone 0.1% cream as needed for itching. )    The following portions of the chart were reviewed this encounter and updated as appropriate:         Review of Systems: No other skin or systemic complaints.    Objective   Well appearing patient in no apparent distress; mood and affect are within normal limits.    A focused examination was performed including lower extremities, including the legs, feet, toes, and toenails. All findings within normal limits unless otherwise noted below.      Lower legs- mild trace ankle edema. No erythema. PIH in areas of prior inflammation    Assessment/Plan   Venous stasis dermatitis of both lower extremities    Related Medications  triamcinolone (Kenalog) 0.1 % cream  Apply to affected areas on body twice daily until clear.      Venous stasis dermatitis of both lower extremities  Left Lower Leg - Anterior; Right Lower Leg - Anterior     Stasis dermatitis   -significantly improved!   -Advised to continue wearing OTC compression stockings daily. She could not tolerate Rx compression in past. Apply in the morning, wear all-day, then remove at bedtime. At bedtime, can apply TAC 0.1% cream x 2-3 weeks prn for dermatitis flares.      -RTC yearly or earlier prn    Scribe Attestation  By signing my name below, IJuany LPN , Scribe   attest that this documentation has been prepared under the direction and in the presence of Cain Ellison MD.

## 2024-08-09 DIAGNOSIS — I48.91 ATRIAL FIBRILLATION, UNSPECIFIED TYPE (MULTI): ICD-10-CM

## 2024-08-15 RX ORDER — BETAXOLOL 20 MG/1
1 TABLET, FILM COATED ORAL DAILY
Qty: 90 TABLET | Refills: 1 | Status: SHIPPED | OUTPATIENT
Start: 2024-08-15

## 2024-08-28 ENCOUNTER — APPOINTMENT (OUTPATIENT)
Dept: PRIMARY CARE | Facility: CLINIC | Age: 84
End: 2024-08-28
Payer: MEDICARE

## 2024-08-28 VITALS
SYSTOLIC BLOOD PRESSURE: 150 MMHG | BODY MASS INDEX: 25.49 KG/M2 | HEIGHT: 61 IN | HEART RATE: 51 BPM | WEIGHT: 135 LBS | DIASTOLIC BLOOD PRESSURE: 78 MMHG | TEMPERATURE: 98 F

## 2024-08-28 DIAGNOSIS — R30.0 BURNING WITH URINATION: ICD-10-CM

## 2024-08-28 DIAGNOSIS — I48.11 LONGSTANDING PERSISTENT ATRIAL FIBRILLATION (MULTI): ICD-10-CM

## 2024-08-28 DIAGNOSIS — Z13.29 THYROID DISORDER SCREENING: ICD-10-CM

## 2024-08-28 DIAGNOSIS — D64.9 ANEMIA, UNSPECIFIED TYPE: ICD-10-CM

## 2024-08-28 DIAGNOSIS — R35.0 FREQUENCY OF URINATION: ICD-10-CM

## 2024-08-28 DIAGNOSIS — E55.9 VITAMIN D DEFICIENCY: ICD-10-CM

## 2024-08-28 DIAGNOSIS — R73.03 PREDIABETES: ICD-10-CM

## 2024-08-28 DIAGNOSIS — I50.32 CHRONIC DIASTOLIC CONGESTIVE HEART FAILURE (MULTI): ICD-10-CM

## 2024-08-28 DIAGNOSIS — N30.00 ACUTE CYSTITIS WITHOUT HEMATURIA: Primary | ICD-10-CM

## 2024-08-28 DIAGNOSIS — R01.1 CARDIAC MURMUR: ICD-10-CM

## 2024-08-28 DIAGNOSIS — I05.9 MITRAL VALVE DISEASE: ICD-10-CM

## 2024-08-28 DIAGNOSIS — Z95.2 S/P MVR (MITRAL VALVE REPLACEMENT): ICD-10-CM

## 2024-08-28 DIAGNOSIS — I10 BENIGN HYPERTENSION: ICD-10-CM

## 2024-08-28 DIAGNOSIS — E78.5 DYSLIPIDEMIA: ICD-10-CM

## 2024-08-28 DIAGNOSIS — Z79.01 ANTICOAGULATED BY ANTICOAGULATION TREATMENT: ICD-10-CM

## 2024-08-28 PROBLEM — Z98.890 PONV (POSTOPERATIVE NAUSEA AND VOMITING): Status: ACTIVE | Noted: 2024-08-28

## 2024-08-28 PROBLEM — Z98.890 HISTORY OF MITRAL VALVE REPAIR: Status: ACTIVE | Noted: 2024-08-28

## 2024-08-28 PROBLEM — Z78.0 ASYMPTOMATIC MENOPAUSAL STATE: Status: ACTIVE | Noted: 2024-01-25

## 2024-08-28 PROBLEM — R11.2 PONV (POSTOPERATIVE NAUSEA AND VOMITING): Status: ACTIVE | Noted: 2024-08-28

## 2024-08-28 PROBLEM — I87.2 VENOUS STASIS DERMATITIS: Status: ACTIVE | Noted: 2024-08-28

## 2024-08-28 LAB
POC APPEARANCE, URINE: ABNORMAL
POC BILIRUBIN, URINE: NEGATIVE
POC BLOOD, URINE: ABNORMAL
POC COLOR, URINE: YELLOW
POC GLUCOSE, URINE: NEGATIVE MG/DL
POC KETONES, URINE: NEGATIVE MG/DL
POC LEUKOCYTES, URINE: ABNORMAL
POC NITRITE,URINE: NEGATIVE
POC PH, URINE: 6 PH
POC PROTEIN, URINE: ABNORMAL MG/DL
POC SPECIFIC GRAVITY, URINE: 1.01
POC UROBILINOGEN, URINE: 0.2 EU/DL

## 2024-08-28 PROCEDURE — 99214 OFFICE O/P EST MOD 30 MIN: CPT | Performed by: INTERNAL MEDICINE

## 2024-08-28 PROCEDURE — 3077F SYST BP >= 140 MM HG: CPT | Performed by: INTERNAL MEDICINE

## 2024-08-28 PROCEDURE — 1123F ACP DISCUSS/DSCN MKR DOCD: CPT | Performed by: INTERNAL MEDICINE

## 2024-08-28 PROCEDURE — 1160F RVW MEDS BY RX/DR IN RCRD: CPT | Performed by: INTERNAL MEDICINE

## 2024-08-28 PROCEDURE — 1158F ADVNC CARE PLAN TLK DOCD: CPT | Performed by: INTERNAL MEDICINE

## 2024-08-28 PROCEDURE — 1159F MED LIST DOCD IN RCRD: CPT | Performed by: INTERNAL MEDICINE

## 2024-08-28 PROCEDURE — 1036F TOBACCO NON-USER: CPT | Performed by: INTERNAL MEDICINE

## 2024-08-28 PROCEDURE — 87086 URINE CULTURE/COLONY COUNT: CPT

## 2024-08-28 PROCEDURE — 81002 URINALYSIS NONAUTO W/O SCOPE: CPT | Performed by: INTERNAL MEDICINE

## 2024-08-28 PROCEDURE — 3078F DIAST BP <80 MM HG: CPT | Performed by: INTERNAL MEDICINE

## 2024-08-28 RX ORDER — FUROSEMIDE 20 MG/1
TABLET ORAL
Qty: 30 TABLET | Refills: 2 | Status: SHIPPED | OUTPATIENT
Start: 2024-08-28

## 2024-08-28 RX ORDER — ACETAMINOPHEN 500 MG
1 TABLET ORAL
COMMUNITY

## 2024-08-28 RX ORDER — LISINOPRIL 2.5 MG/1
2.5 TABLET ORAL DAILY
Qty: 90 TABLET | Refills: 3 | Status: SHIPPED | OUTPATIENT
Start: 2024-08-28 | End: 2025-08-28

## 2024-08-28 RX ORDER — NITROFURANTOIN 25; 75 MG/1; MG/1
100 CAPSULE ORAL 2 TIMES DAILY
Qty: 10 CAPSULE | Refills: 0 | Status: SHIPPED | OUTPATIENT
Start: 2024-08-28 | End: 2024-09-02

## 2024-08-28 ASSESSMENT — ENCOUNTER SYMPTOMS
FREQUENCY: 1
NAUSEA: 0
CONFUSION: 0
DIARRHEA: 0
FLANK PAIN: 0
ABDOMINAL PAIN: 0
BLOOD IN STOOL: 0
HEMATURIA: 0
SHORTNESS OF BREATH: 0
PALPITATIONS: 0
SORE THROAT: 0
DIZZINESS: 0
VOMITING: 0
AGITATION: 0
LIGHT-HEADEDNESS: 0
COUGH: 0
CONSTIPATION: 0
CHILLS: 0
DYSURIA: 1
FEVER: 0

## 2024-08-28 ASSESSMENT — PATIENT HEALTH QUESTIONNAIRE - PHQ9
2. FEELING DOWN, DEPRESSED OR HOPELESS: NOT AT ALL
1. LITTLE INTEREST OR PLEASURE IN DOING THINGS: NOT AT ALL
SUM OF ALL RESPONSES TO PHQ9 QUESTIONS 1 AND 2: 0

## 2024-08-28 NOTE — PROGRESS NOTES
"Subjective   Patient ID: Kymberly Layne is a 84 y.o. female who presents for Follow-up, Results, and UTI (X 10days burning with ).    HPI Patient having burning with urination for 10 days, urinary frequency.  Denies blood in the urine. Denies fever or chills. Denies back pain. Patient goes to the coumadin clinic at  for management of INR. She has a history of atrial fibrillation and mitral valve repair. Patient does not follow with a cardiologist. She is isn't checking her BP at home.  She refuses to see a cardiologist since she feels her condition has been stable in terms of atrial fibrillation.  She states she is not looking for trouble.  We did discuss repeating an echocardiogram given her history of mitral valve disease and cardiac murmur which she is agreeable to.  Blood pressure was elevated in the office today 150/78.  Patient is due for blood work.  She defers updated pneumonia vaccine.  She has documented history of diastolic heart failure on furosemide as needed.  She needs refills on this today.    Review of Systems   Constitutional:  Negative for chills and fever.   HENT:  Negative for sore throat.    Eyes:  Negative for visual disturbance.   Respiratory:  Negative for cough and shortness of breath.    Cardiovascular:  Negative for chest pain, palpitations and leg swelling.   Gastrointestinal:  Negative for abdominal pain, blood in stool, constipation, diarrhea, nausea and vomiting.   Genitourinary:  Positive for dysuria and frequency. Negative for flank pain and hematuria.   Skin:  Negative for rash.   Neurological:  Negative for dizziness, syncope and light-headedness.   Psychiatric/Behavioral:  Negative for agitation and confusion.        Objective   /78   Pulse 51   Temp 36.7 °C (98 °F) (Temporal)   Ht 1.549 m (5' 1\")   Wt 61.2 kg (135 lb)   BMI 25.51 kg/m²     Physical Exam  Vitals and nursing note reviewed.   Constitutional:       General: She is not in acute distress.     Appearance: " Normal appearance. She is not ill-appearing or toxic-appearing.   HENT:      Head: Normocephalic and atraumatic.      Mouth/Throat:      Mouth: Mucous membranes are moist.      Pharynx: Oropharynx is clear. No oropharyngeal exudate.   Eyes:      Pupils: Pupils are equal, round, and reactive to light.   Cardiovascular:      Rate and Rhythm: Normal rate. Rhythm irregular.      Heart sounds: Murmur heard.   Pulmonary:      Effort: Pulmonary effort is normal. No respiratory distress.      Breath sounds: No wheezing, rhonchi or rales.   Abdominal:      General: Bowel sounds are normal. There is no distension.      Palpations: Abdomen is soft. There is no mass.      Tenderness: There is no abdominal tenderness.   Musculoskeletal:      Cervical back: Neck supple.      Right lower leg: No edema.      Left lower leg: No edema.   Lymphadenopathy:      Cervical: No cervical adenopathy.   Skin:     General: Skin is warm and dry.      Coloration: Skin is not jaundiced or pale.      Findings: No rash.      Comments: Venous stasis skin changes to bilateral lower extremities with hemosiderin deposition   Neurological:      General: No focal deficit present.      Mental Status: She is alert and oriented to person, place, and time.   Psychiatric:         Mood and Affect: Mood normal.         Behavior: Behavior normal.         Thought Content: Thought content normal.         Judgment: Judgment normal.         Assessment/Plan   Problem List Items Addressed This Visit             ICD-10-CM    Anemia D64.9    Relevant Orders    Ferritin    Iron and TIBC    Reticulocytes    Benign hypertension I10    Relevant Medications    lisinopril 2.5 mg tablet    Other Relevant Orders    CBC and Auto Differential    Comprehensive metabolic panel    CHF (congestive heart failure) (Multi) I50.9    Relevant Medications    furosemide (Lasix) 20 mg tablet    Dyslipidemia E78.5    Relevant Orders    Lipid panel    Vitamin D deficiency E55.9    Relevant  Orders    Vitamin D 25-Hydroxy,Total (for eval of Vitamin D levels)    S/P MVR (mitral valve replacement) Z95.2    Relevant Orders    Transthoracic Echo (TTE) Complete    Prediabetes R73.03    Relevant Orders    Hemoglobin A1C    Mitral valve disease I05.9    Relevant Orders    Transthoracic Echo (TTE) Complete    Atrial fibrillation (Multi) I48.91    Anticoagulated by anticoagulation treatment Z79.01    Acute cystitis without hematuria - Primary N30.00    Relevant Medications    nitrofurantoin, macrocrystal-monohydrate, (Macrobid) 100 mg capsule    Frequency of urination R35.0    Relevant Medications    nitrofurantoin, macrocrystal-monohydrate, (Macrobid) 100 mg capsule    Other Relevant Orders    POCT UA (nonautomated) manually resulted (Completed)    Urine Culture    Burning with urination R30.0    Relevant Medications    nitrofurantoin, macrocrystal-monohydrate, (Macrobid) 100 mg capsule    Other Relevant Orders    POCT UA (nonautomated) manually resulted (Completed)    Urine Culture    Thyroid disorder screening Z13.29    Relevant Orders    TSH with reflex to Free T4 if abnormal    Cardiac murmur R01.1    Relevant Orders    Transthoracic Echo (TTE) Complete     Anemia: Will check ferritin, iron, reticulocyte count for recheck denies any blood loss.  No blood in the stool.    Hypertension: Chronic, uncontrolled increase lisinopril 2.5 mg daily continue her beta-blocker and monitor blood pressures at home follow-up in 2 to 3 months for recheck    CHF: Chronic, stable does not appear to be in an uncompensated CHF at this time.  She is due for repeat echocardiogram which has been ordered we will continue Lasix as needed    Dyslipidemia: We will check a lipid panel    Vitamin D deficiency: We will check a vitamin D    Cardiac murmur/status post mitral valve replacement: Will recheck an echocardiogram as she has not had this in several years she does have intermittent edema and dyspnea on exertion.    Prediabetes:  Check hemoglobin A1c    Acute cystitis without hematuria she will be started on nitrofurantoin and urine culture sent no signs or symptoms of complex UTI    Atrial fibrillation: Chronic, stable managed by anticoagulation with warfarin rate control strategy with beta-blocker

## 2024-08-30 LAB — BACTERIA UR CULT: ABNORMAL

## 2024-09-03 ENCOUNTER — LAB (OUTPATIENT)
Dept: LAB | Facility: LAB | Age: 84
End: 2024-09-03
Payer: MEDICARE

## 2024-09-03 DIAGNOSIS — Z13.29 THYROID DISORDER SCREENING: ICD-10-CM

## 2024-09-03 DIAGNOSIS — E78.5 DYSLIPIDEMIA: ICD-10-CM

## 2024-09-03 DIAGNOSIS — D64.9 ANEMIA, UNSPECIFIED TYPE: ICD-10-CM

## 2024-09-03 DIAGNOSIS — I10 BENIGN HYPERTENSION: ICD-10-CM

## 2024-09-03 DIAGNOSIS — E55.9 VITAMIN D DEFICIENCY: ICD-10-CM

## 2024-09-03 DIAGNOSIS — R73.03 PREDIABETES: ICD-10-CM

## 2024-09-03 LAB
25(OH)D3 SERPL-MCNC: 59 NG/ML (ref 30–100)
ALBUMIN SERPL BCP-MCNC: 4.3 G/DL (ref 3.4–5)
ALP SERPL-CCNC: 63 U/L (ref 33–136)
ALT SERPL W P-5'-P-CCNC: 15 U/L (ref 7–45)
ANION GAP SERPL CALC-SCNC: 10 MMOL/L (ref 10–20)
AST SERPL W P-5'-P-CCNC: 29 U/L (ref 9–39)
BASOPHILS # BLD AUTO: 0.02 X10*3/UL (ref 0–0.1)
BASOPHILS NFR BLD AUTO: 0.5 %
BILIRUB SERPL-MCNC: 1 MG/DL (ref 0–1.2)
BUN SERPL-MCNC: 28 MG/DL (ref 6–23)
CALCIUM SERPL-MCNC: 9.5 MG/DL (ref 8.6–10.3)
CHLORIDE SERPL-SCNC: 105 MMOL/L (ref 98–107)
CHOLEST SERPL-MCNC: 139 MG/DL (ref 0–199)
CHOLESTEROL/HDL RATIO: 2.9
CO2 SERPL-SCNC: 30 MMOL/L (ref 21–32)
CREAT SERPL-MCNC: 0.76 MG/DL (ref 0.5–1.05)
EGFRCR SERPLBLD CKD-EPI 2021: 78 ML/MIN/1.73M*2
EOSINOPHIL # BLD AUTO: 0.05 X10*3/UL (ref 0–0.4)
EOSINOPHIL NFR BLD AUTO: 1.1 %
ERYTHROCYTE [DISTWIDTH] IN BLOOD BY AUTOMATED COUNT: 13.1 % (ref 11.5–14.5)
EST. AVERAGE GLUCOSE BLD GHB EST-MCNC: 111 MG/DL
FERRITIN SERPL-MCNC: 187 NG/ML (ref 8–150)
GLUCOSE SERPL-MCNC: 100 MG/DL (ref 74–99)
HBA1C MFR BLD: 5.5 %
HCT VFR BLD AUTO: 35.9 % (ref 36–46)
HDLC SERPL-MCNC: 48.5 MG/DL
HGB BLD-MCNC: 11.5 G/DL (ref 12–16)
HGB RETIC QN: 35 PG (ref 28–38)
IMM GRANULOCYTES # BLD AUTO: 0.01 X10*3/UL (ref 0–0.5)
IMM GRANULOCYTES NFR BLD AUTO: 0.2 % (ref 0–0.9)
IMMATURE RETIC FRACTION: 9.3 %
IRON SATN MFR SERPL: 30 % (ref 25–45)
IRON SERPL-MCNC: 99 UG/DL (ref 35–150)
LDLC SERPL CALC-MCNC: 76 MG/DL
LYMPHOCYTES # BLD AUTO: 1.44 X10*3/UL (ref 0.8–3)
LYMPHOCYTES NFR BLD AUTO: 32.4 %
MCH RBC QN AUTO: 34.1 PG (ref 26–34)
MCHC RBC AUTO-ENTMCNC: 32 G/DL (ref 32–36)
MCV RBC AUTO: 107 FL (ref 80–100)
MONOCYTES # BLD AUTO: 0.42 X10*3/UL (ref 0.05–0.8)
MONOCYTES NFR BLD AUTO: 9.5 %
NEUTROPHILS # BLD AUTO: 2.5 X10*3/UL (ref 1.6–5.5)
NEUTROPHILS NFR BLD AUTO: 56.3 %
NON HDL CHOLESTEROL: 91 MG/DL (ref 0–149)
NRBC BLD-RTO: 0 /100 WBCS (ref 0–0)
PLATELET # BLD AUTO: 148 X10*3/UL (ref 150–450)
POTASSIUM SERPL-SCNC: 4.3 MMOL/L (ref 3.5–5.3)
PROT SERPL-MCNC: 6.9 G/DL (ref 6.4–8.2)
RBC # BLD AUTO: 3.37 X10*6/UL (ref 4–5.2)
RETICS #: 0.05 X10*6/UL (ref 0.02–0.11)
RETICS/RBC NFR AUTO: 1.5 % (ref 0.5–2)
SODIUM SERPL-SCNC: 141 MMOL/L (ref 136–145)
TIBC SERPL-MCNC: 331 UG/DL (ref 240–445)
TRIGL SERPL-MCNC: 73 MG/DL (ref 0–149)
TSH SERPL-ACNC: 1.22 MIU/L (ref 0.44–3.98)
UIBC SERPL-MCNC: 232 UG/DL (ref 110–370)
VLDL: 15 MG/DL (ref 0–40)
WBC # BLD AUTO: 4.4 X10*3/UL (ref 4.4–11.3)

## 2024-09-03 PROCEDURE — 84443 ASSAY THYROID STIM HORMONE: CPT

## 2024-09-03 PROCEDURE — 85045 AUTOMATED RETICULOCYTE COUNT: CPT

## 2024-09-03 PROCEDURE — 82306 VITAMIN D 25 HYDROXY: CPT

## 2024-09-03 PROCEDURE — 83540 ASSAY OF IRON: CPT

## 2024-09-03 PROCEDURE — 83550 IRON BINDING TEST: CPT

## 2024-09-03 PROCEDURE — 80053 COMPREHEN METABOLIC PANEL: CPT

## 2024-09-03 PROCEDURE — 83036 HEMOGLOBIN GLYCOSYLATED A1C: CPT

## 2024-09-03 PROCEDURE — 36415 COLL VENOUS BLD VENIPUNCTURE: CPT

## 2024-09-03 PROCEDURE — 82728 ASSAY OF FERRITIN: CPT

## 2024-09-03 PROCEDURE — 80061 LIPID PANEL: CPT

## 2024-09-03 PROCEDURE — 85025 COMPLETE CBC W/AUTO DIFF WBC: CPT

## 2024-09-17 ENCOUNTER — HOSPITAL ENCOUNTER (OUTPATIENT)
Dept: CARDIOLOGY | Facility: CLINIC | Age: 84
Discharge: HOME | End: 2024-09-17
Payer: MEDICARE

## 2024-09-17 VITALS
DIASTOLIC BLOOD PRESSURE: 80 MMHG | BODY MASS INDEX: 25.49 KG/M2 | SYSTOLIC BLOOD PRESSURE: 142 MMHG | WEIGHT: 135 LBS | HEIGHT: 61 IN

## 2024-09-17 DIAGNOSIS — Z95.2 S/P MVR (MITRAL VALVE REPLACEMENT): ICD-10-CM

## 2024-09-17 DIAGNOSIS — I05.9 MITRAL VALVE DISEASE: ICD-10-CM

## 2024-09-17 DIAGNOSIS — R01.1 CARDIAC MURMUR: ICD-10-CM

## 2024-09-17 LAB
AORTIC VALVE MEAN GRADIENT: 5 MMHG
AORTIC VALVE PEAK VELOCITY: 1.4 M/S
AV PEAK GRADIENT: 7.8 MMHG
AVA (PEAK VEL): 1.42 CM2
AVA (VTI): 1.17 CM2
EJECTION FRACTION APICAL 4 CHAMBER: 66.7
EJECTION FRACTION: 63 %
LEFT VENTRICLE INTERNAL DIMENSION DIASTOLE: 4.91 CM (ref 3.5–6)
LEFT VENTRICULAR OUTFLOW TRACT DIAMETER: 1.9 CM
LV EJECTION FRACTION BIPLANE: 62 %
MITRAL VALVE E/E' RATIO: 5.9
RIGHT VENTRICLE PEAK SYSTOLIC PRESSURE: 33.5 MMHG

## 2024-09-17 PROCEDURE — 93306 TTE W/DOPPLER COMPLETE: CPT

## 2024-09-17 PROCEDURE — 93306 TTE W/DOPPLER COMPLETE: CPT | Performed by: INTERNAL MEDICINE

## 2024-10-08 ENCOUNTER — APPOINTMENT (OUTPATIENT)
Dept: DERMATOLOGY | Facility: CLINIC | Age: 84
End: 2024-10-08
Payer: MEDICARE

## 2024-10-08 DIAGNOSIS — R20.8 SKIN PAIN: ICD-10-CM

## 2024-10-08 DIAGNOSIS — D17.21 LIPOMA OF RIGHT UPPER EXTREMITY: Primary | ICD-10-CM

## 2024-10-08 PROCEDURE — 1159F MED LIST DOCD IN RCRD: CPT | Performed by: DERMATOLOGY

## 2024-10-08 PROCEDURE — 1123F ACP DISCUSS/DSCN MKR DOCD: CPT | Performed by: DERMATOLOGY

## 2024-10-08 PROCEDURE — 99213 OFFICE O/P EST LOW 20 MIN: CPT | Performed by: DERMATOLOGY

## 2024-10-08 PROCEDURE — 1036F TOBACCO NON-USER: CPT | Performed by: DERMATOLOGY

## 2024-10-08 ASSESSMENT — DERMATOLOGY QUALITY OF LIFE (QOL) ASSESSMENT
ARE THERE EXCLUSIONS OR EXCEPTIONS FOR THE QUALITY OF LIFE ASSESSMENT: NO
RATE HOW BOTHERED YOU ARE BY EFFECTS OF YOUR SKIN PROBLEMS ON YOUR ACTIVITIES (EG, GOING OUT, ACCOMPLISHING WHAT YOU WANT, WORK ACTIVITIES OR YOUR RELATIONSHIPS WITH OTHERS): 4
RATE HOW BOTHERED YOU ARE BY SYMPTOMS OF YOUR SKIN PROBLEM (EG, ITCHING, STINGING BURNING, HURTING OR SKIN IRRITATION): 6 - ALWAYS BOTHERED
RATE HOW EMOTIONALLY BOTHERED YOU ARE BY YOUR SKIN PROBLEM (FOR EXAMPLE, WORRY, EMBARRASSMENT, FRUSTRATION): 6 - ALWAYS BOTHERED
WHAT SINGLE SKIN CONDITION LISTED BELOW IS THE PATIENT ANSWERING THE QUALITY-OF-LIFE ASSESSMENT QUESTIONS ABOUT: NONE OF THE ABOVE

## 2024-10-08 ASSESSMENT — DERMATOLOGY PATIENT ASSESSMENT
ARE YOU ON BIRTH CONTROL: NO
DO YOU USE A TANNING BED: NO
WHERE ARE THESE NEW OR CHANGING LESIONS LOCATED: RIGHT SHOULDER
DO YOU USE SUNSCREEN: OCCASIONALLY
FOR PATIENTS COMING IN FOR A FOLLOW-UP VISIT - HAVE THERE BEEN ANY CHANGES IN YOUR HEALTH SINCE YOUR LAST VISIT: NO
ARE YOU TRYING TO GET PREGNANT: NO
DO YOU HAVE ANY NEW OR CHANGING LESIONS: YES
ARE YOU AN ORGAN TRANSPLANT RECIPIENT: NO

## 2024-10-08 ASSESSMENT — ITCH NUMERIC RATING SCALE: HOW SEVERE IS YOUR ITCHING?: 0

## 2024-10-08 NOTE — PROGRESS NOTES
Subjective     Kymberly Layne is a 84 y.o. female who presents for the following: lesion (Pt has lesion/lump on right shoulder. Lesion has been present for a while. Lesion is painful and enlarging. No hx of skin cancer.).     Review of Systems:  No other skin or systemic complaints other than what is documented elsewhere in the note.    The following portions of the chart were reviewed this encounter and updated as appropriate:          Skin Cancer History  No skin cancer on file.      Specialty Problems    None       Objective   Well appearing patient in no apparent distress; mood and affect are within normal limits.    A focused skin examination was performed of the right shoulder.  All findings within normal limits unless otherwise noted below.    Assessment/Plan   1. Lipoma of right upper extremity  Right Shoulder - Anterior  4.5cm mobile fleshy tumor          Lipomas are benign growths of fatty tissue of unknown cause.   These lesions can be removed surgically, however this procedure requires a separate appointment, local anesthesia is used and often requires both deep and superficial sutures (stitches).   Following removal the lesion will be traded for a scar.   Risks and benefits of removal include but are limited to: incomplete removal, recurrence, keloid (thickened, itchy or painful scar) formation, wound dehiscence (opening) and need to limit activity for 10-14 days following procedure.    Will submit PA for patient today.    Prior Authorization for Skin Excision - Lipomas - Right Shoulder - Anterior

## 2024-10-10 ENCOUNTER — APPOINTMENT (OUTPATIENT)
Dept: PRIMARY CARE | Facility: CLINIC | Age: 84
End: 2024-10-10
Payer: MEDICARE

## 2024-10-10 VITALS
SYSTOLIC BLOOD PRESSURE: 144 MMHG | BODY MASS INDEX: 25.52 KG/M2 | HEIGHT: 60 IN | DIASTOLIC BLOOD PRESSURE: 80 MMHG | TEMPERATURE: 97.8 F | HEART RATE: 71 BPM | WEIGHT: 130 LBS

## 2024-10-10 DIAGNOSIS — I10 BENIGN HYPERTENSION: ICD-10-CM

## 2024-10-10 DIAGNOSIS — Z95.2 S/P MVR (MITRAL VALVE REPLACEMENT): ICD-10-CM

## 2024-10-10 DIAGNOSIS — I48.11 LONGSTANDING PERSISTENT ATRIAL FIBRILLATION (MULTI): ICD-10-CM

## 2024-10-10 DIAGNOSIS — Z79.01 ANTICOAGULATED BY ANTICOAGULATION TREATMENT: ICD-10-CM

## 2024-10-10 DIAGNOSIS — Z23 ENCOUNTER FOR IMMUNIZATION: ICD-10-CM

## 2024-10-10 DIAGNOSIS — I05.9 MITRAL VALVE DISEASE: ICD-10-CM

## 2024-10-10 DIAGNOSIS — D64.9 ANEMIA, UNSPECIFIED TYPE: ICD-10-CM

## 2024-10-10 DIAGNOSIS — Z00.00 HEALTHCARE MAINTENANCE: Primary | ICD-10-CM

## 2024-10-10 DIAGNOSIS — I07.1 TRICUSPID VALVE INSUFFICIENCY, UNSPECIFIED ETIOLOGY: ICD-10-CM

## 2024-10-10 DIAGNOSIS — R01.1 CARDIAC MURMUR: ICD-10-CM

## 2024-10-10 DIAGNOSIS — I50.32 CHRONIC DIASTOLIC CONGESTIVE HEART FAILURE: ICD-10-CM

## 2024-10-10 DIAGNOSIS — D69.6 THROMBOCYTOPENIA (CMS-HCC): ICD-10-CM

## 2024-10-10 PROCEDURE — 99214 OFFICE O/P EST MOD 30 MIN: CPT | Performed by: INTERNAL MEDICINE

## 2024-10-10 PROCEDURE — G0008 ADMIN INFLUENZA VIRUS VAC: HCPCS | Performed by: INTERNAL MEDICINE

## 2024-10-10 PROCEDURE — 3077F SYST BP >= 140 MM HG: CPT | Performed by: INTERNAL MEDICINE

## 2024-10-10 PROCEDURE — G0439 PPPS, SUBSEQ VISIT: HCPCS | Performed by: INTERNAL MEDICINE

## 2024-10-10 PROCEDURE — 90656 IIV3 VACC NO PRSV 0.5 ML IM: CPT | Performed by: INTERNAL MEDICINE

## 2024-10-10 PROCEDURE — 1160F RVW MEDS BY RX/DR IN RCRD: CPT | Performed by: INTERNAL MEDICINE

## 2024-10-10 PROCEDURE — 1159F MED LIST DOCD IN RCRD: CPT | Performed by: INTERNAL MEDICINE

## 2024-10-10 PROCEDURE — 1036F TOBACCO NON-USER: CPT | Performed by: INTERNAL MEDICINE

## 2024-10-10 PROCEDURE — 1158F ADVNC CARE PLAN TLK DOCD: CPT | Performed by: INTERNAL MEDICINE

## 2024-10-10 PROCEDURE — 3079F DIAST BP 80-89 MM HG: CPT | Performed by: INTERNAL MEDICINE

## 2024-10-10 PROCEDURE — 1123F ACP DISCUSS/DSCN MKR DOCD: CPT | Performed by: INTERNAL MEDICINE

## 2024-10-10 PROCEDURE — 1170F FXNL STATUS ASSESSED: CPT | Performed by: INTERNAL MEDICINE

## 2024-10-10 RX ORDER — LISINOPRIL 5 MG/1
5 TABLET ORAL DAILY
Qty: 90 TABLET | Refills: 3 | Status: SHIPPED | OUTPATIENT
Start: 2024-10-10 | End: 2025-10-10

## 2024-10-10 ASSESSMENT — ENCOUNTER SYMPTOMS
SHORTNESS OF BREATH: 0
VOMITING: 0
DIARRHEA: 0
LIGHT-HEADEDNESS: 0
CONSTIPATION: 0
NAUSEA: 0
BLOOD IN STOOL: 0
COUGH: 0
SORE THROAT: 0
PALPITATIONS: 0
AGITATION: 0
DIZZINESS: 0
ABDOMINAL PAIN: 0
CHILLS: 0
FEVER: 0

## 2024-10-10 ASSESSMENT — PATIENT HEALTH QUESTIONNAIRE - PHQ9
SUM OF ALL RESPONSES TO PHQ9 QUESTIONS 1 AND 2: 0
1. LITTLE INTEREST OR PLEASURE IN DOING THINGS: NOT AT ALL
2. FEELING DOWN, DEPRESSED OR HOPELESS: NOT AT ALL

## 2024-10-10 ASSESSMENT — ACTIVITIES OF DAILY LIVING (ADL)
TAKING_MEDICATION: INDEPENDENT
DOING_HOUSEWORK: INDEPENDENT
GROCERY_SHOPPING: INDEPENDENT
DRESSING: INDEPENDENT
MANAGING_FINANCES: INDEPENDENT
BATHING: INDEPENDENT

## 2024-10-10 NOTE — PROGRESS NOTES
Subjective   Reason for Visit: Kymberly Layne is an 84 y.o. female here for a Medicare Wellness visit.          Reviewed all medications by prescribing practitioner or clinical pharmacist (such as prescriptions, OTCs, herbal therapies and supplements) and documented in the medical record.    HPI patient is a 84-year-old female past medical history of atrial fibrillation on chronic anticoagulation with Coumadin, status post mitral valve replacement, venous stasis, cardiac murmur presents to the office today for annual wellness visit.  Reports her health to be good.  She is up-to-date on age and gender recommended screening.  Defers mammogram.  She is up-to-date on age and recommended vaccinations exception of flu shot which will be given today.  She request the standard flu dose.  She is also due for pneumonia vaccine which will be given at next office visit at her request.  She defers shingles vaccine.  She denies any fever, chills, chest pain or shortness of breath, lightness, dizziness.  She will have occasional lower extremity edema that is treated with as needed furosemide.  She does not see a cardiologist though this has been recommended in the past given her history of mitral valve replacement and history of atrial fibrillation.  She had a recent echocardiogram that we ordered on 9/17/2024  Echocardiogram showed normal left ventricular ejection fraction of 60 to 65% there was severe concentric left ventricular hypertrophy as well as left atrium severely dilated.  No evidence of aortic valve stenosis there was mild aortic valve regurgitation and moderate to severe tricuspid valve regurgitation.  We reviewed her labs in detail today she had a mild thrombocytopenia and has chronic anemia.  She has normal iron levels in fact elevated ferritin level.    Patient is having left shoulder pain following with orthopedics for this.  States she recently had a cortisone injection and is on a steroid pack  currently.    Patient Care Team:  Fredi Salgado DO as PCP - General (Internal Medicine)  Cornelio Park DO as PCP - [employer] PCP     Review of Systems   Constitutional:  Negative for chills and fever.   HENT:  Negative for sore throat.    Eyes:  Negative for visual disturbance.   Respiratory:  Negative for cough and shortness of breath.    Cardiovascular:  Negative for chest pain, palpitations and leg swelling.   Gastrointestinal:  Negative for abdominal pain, blood in stool, constipation, diarrhea, nausea and vomiting.   Skin:  Negative for rash.   Neurological:  Negative for dizziness, syncope and light-headedness.   Psychiatric/Behavioral:  Negative for agitation.        Objective   Vitals:  /80 (BP Location: Right arm, Patient Position: Sitting, BP Cuff Size: Adult)   Pulse 71   Temp 36.6 °C (97.8 °F)   Ht 1.524 m (5')   Wt 59 kg (130 lb)   BMI 25.39 kg/m²       Physical Exam  Vitals and nursing note reviewed.   Constitutional:       General: She is not in acute distress.     Appearance: Normal appearance. She is not ill-appearing, toxic-appearing or diaphoretic.   HENT:      Head: Normocephalic and atraumatic.      Mouth/Throat:      Mouth: Mucous membranes are moist.      Pharynx: Oropharynx is clear. No oropharyngeal exudate.   Eyes:      Pupils: Pupils are equal, round, and reactive to light.   Cardiovascular:      Rate and Rhythm: Normal rate. Rhythm irregular.      Heart sounds: Murmur heard.   Pulmonary:      Effort: Pulmonary effort is normal. No respiratory distress.      Breath sounds: Normal breath sounds. No wheezing, rhonchi or rales.   Abdominal:      General: There is no distension.      Palpations: Abdomen is soft. There is no mass.      Tenderness: There is no abdominal tenderness.   Musculoskeletal:      Cervical back: Neck supple.      Right lower leg: No edema.      Left lower leg: No edema.   Lymphadenopathy:      Cervical: No cervical adenopathy.   Skin:      General: Skin is warm and dry.      Coloration: Skin is not jaundiced or pale.      Findings: No rash.   Neurological:      General: No focal deficit present.      Mental Status: She is alert and oriented to person, place, and time.      Cranial Nerves: No cranial nerve deficit.   Psychiatric:         Mood and Affect: Mood normal.         Behavior: Behavior normal.         Thought Content: Thought content normal.         Judgment: Judgment normal.         Assessment & Plan  Chronic diastolic congestive heart failure    Orders:    Referral to Cardiology; Future    S/P MVR (mitral valve replacement)    Orders:    Referral to Cardiology; Future    Cardiac murmur    Orders:    Referral to Cardiology; Future    Longstanding persistent atrial fibrillation (Multi)    Orders:    Referral to Cardiology; Future    Anticoagulated by anticoagulation treatment    Orders:    Referral to Cardiology; Future    Mitral valve disease    Orders:    Referral to Cardiology; Future    Tricuspid valve insufficiency, unspecified etiology    Orders:    Referral to Cardiology; Future    Encounter for immunization    Orders:    Flu vaccine, trivalent, preservative free, age 6 months and greater (Fluarix/Fluzone/Flulaval)    Benign hypertension    Orders:    lisinopril 5 mg tablet; Take 1 tablet (5 mg) by mouth once daily.    Anemia, unspecified type    Orders:    CBC and Auto Differential; Future    Serum Protein Electrophoresis + Immunofixation; Future    Vitamin B12; Future    Thrombocytopenia (CMS-HCC)    Orders:    CBC and Auto Differential; Future    Healthcare maintenance            Health maintenance examination: Patient is up-to-date on age and gender recommended screening.  She is due for multiple vaccinations will be given the flu shot today she will be given the pneumonia shot at her next office visit and recommended shingles vaccine which she defers.    Thrombocytopenia: Will recheck check a CBC.  If remains low will consider  referral to hematology given she also has anemia.    Anemia: Will check CBC serum protein electrophoresis, vitamin B12 level.  Her anemia is stable.    Hypertension: Chronic, uncontrolled will increase lisinopril to 5 mg daily she will continue her current beta-blocker.  She will follow-up with cardiology    Tricuspid valve insufficiency: Referral to cardiology has been placed    Mitral valve disease: Will have her follow-up with cardiology given her history of mitral valve replacement    Atrial fibrillation: Currently on rate control strategy which is working well and on anticoagulation without signs or symptoms of bleeding    Chronic diastolic heart failure: She will continue furosemide

## 2024-10-10 NOTE — ASSESSMENT & PLAN NOTE
Orders:    Flu vaccine, trivalent, preservative free, age 6 months and greater (Fluarix/Fluzone/Flulaval)

## 2024-10-10 NOTE — ASSESSMENT & PLAN NOTE
Orders:    CBC and Auto Differential; Future    Serum Protein Electrophoresis + Immunofixation; Future    Vitamin B12; Future

## 2024-10-14 ENCOUNTER — TELEPHONE (OUTPATIENT)
Dept: DERMATOLOGY | Facility: CLINIC | Age: 84
End: 2024-10-14
Payer: MEDICARE

## 2024-10-14 NOTE — TELEPHONE ENCOUNTER
Call placed to pt to schedule Excision with RP. Per pts , he will have her call back, when she is available.

## 2024-11-02 DIAGNOSIS — I48.91 ATRIAL FIBRILLATION, UNSPECIFIED TYPE (MULTI): ICD-10-CM

## 2024-11-04 RX ORDER — WARFARIN SODIUM 5 MG/1
TABLET ORAL
Qty: 180 TABLET | Refills: 1 | OUTPATIENT
Start: 2024-11-04

## 2024-11-20 ENCOUNTER — APPOINTMENT (OUTPATIENT)
Dept: PRIMARY CARE | Facility: CLINIC | Age: 84
End: 2024-11-20
Payer: MEDICARE

## 2024-11-20 VITALS
HEIGHT: 60 IN | TEMPERATURE: 97.5 F | SYSTOLIC BLOOD PRESSURE: 138 MMHG | BODY MASS INDEX: 25.4 KG/M2 | HEART RATE: 83 BPM | DIASTOLIC BLOOD PRESSURE: 91 MMHG | WEIGHT: 129.4 LBS

## 2024-11-20 DIAGNOSIS — D64.9 ANEMIA, UNSPECIFIED TYPE: ICD-10-CM

## 2024-11-20 DIAGNOSIS — M25.412 SWELLING OF JOINT, SHOULDER, LEFT: ICD-10-CM

## 2024-11-20 DIAGNOSIS — M25.512 CHRONIC LEFT SHOULDER PAIN: Primary | ICD-10-CM

## 2024-11-20 DIAGNOSIS — D69.6 THROMBOCYTOPENIA (CMS-HCC): ICD-10-CM

## 2024-11-20 DIAGNOSIS — K29.00 ACUTE GASTRITIS WITHOUT HEMORRHAGE, UNSPECIFIED GASTRITIS TYPE: ICD-10-CM

## 2024-11-20 DIAGNOSIS — G89.29 CHRONIC LEFT SHOULDER PAIN: Primary | ICD-10-CM

## 2024-11-20 PROCEDURE — 1160F RVW MEDS BY RX/DR IN RCRD: CPT | Performed by: INTERNAL MEDICINE

## 2024-11-20 PROCEDURE — 3075F SYST BP GE 130 - 139MM HG: CPT | Performed by: INTERNAL MEDICINE

## 2024-11-20 PROCEDURE — 1159F MED LIST DOCD IN RCRD: CPT | Performed by: INTERNAL MEDICINE

## 2024-11-20 PROCEDURE — 99213 OFFICE O/P EST LOW 20 MIN: CPT | Performed by: INTERNAL MEDICINE

## 2024-11-20 PROCEDURE — G2211 COMPLEX E/M VISIT ADD ON: HCPCS | Performed by: INTERNAL MEDICINE

## 2024-11-20 PROCEDURE — 3080F DIAST BP >= 90 MM HG: CPT | Performed by: INTERNAL MEDICINE

## 2024-11-20 PROCEDURE — 1158F ADVNC CARE PLAN TLK DOCD: CPT | Performed by: INTERNAL MEDICINE

## 2024-11-20 PROCEDURE — 1123F ACP DISCUSS/DSCN MKR DOCD: CPT | Performed by: INTERNAL MEDICINE

## 2024-11-20 RX ORDER — LIDOCAINE 50 MG/G
1 PATCH TOPICAL DAILY
Qty: 30 PATCH | Refills: 11 | Status: SHIPPED | OUTPATIENT
Start: 2024-11-20 | End: 2024-12-16

## 2024-11-20 RX ORDER — PANTOPRAZOLE SODIUM 40 MG/1
40 TABLET, DELAYED RELEASE ORAL
Qty: 30 TABLET | Refills: 11 | Status: SHIPPED | OUTPATIENT
Start: 2024-11-20 | End: 2025-04-18 | Stop reason: WASHOUT

## 2024-11-20 ASSESSMENT — ENCOUNTER SYMPTOMS
FEVER: 0
DIARRHEA: 0
COUGH: 0
ARTHRALGIAS: 1
SHORTNESS OF BREATH: 0
SORE THROAT: 0
NAUSEA: 1
ABDOMINAL PAIN: 1
LIGHT-HEADEDNESS: 0
VOMITING: 0
BLOOD IN STOOL: 0
CONSTIPATION: 0
HEMATURIA: 0
DIZZINESS: 0
CHILLS: 0

## 2024-11-20 ASSESSMENT — PATIENT HEALTH QUESTIONNAIRE - PHQ9
2. FEELING DOWN, DEPRESSED OR HOPELESS: NOT AT ALL
1. LITTLE INTEREST OR PLEASURE IN DOING THINGS: NOT AT ALL
2. FEELING DOWN, DEPRESSED OR HOPELESS: NOT AT ALL
SUM OF ALL RESPONSES TO PHQ9 QUESTIONS 1 AND 2: 0
1. LITTLE INTEREST OR PLEASURE IN DOING THINGS: NOT AT ALL
SUM OF ALL RESPONSES TO PHQ9 QUESTIONS 1 AND 2: 0

## 2024-11-20 NOTE — PROGRESS NOTES
Subjective   Patient ID: Kymberly Layne is a 84 y.o. female who presents for Arm Pain (Left arm pain.  About 1 -2 weeks ago she was having swelling and pain in her left shoulder.).    Arm Pain   Pertinent negatives include no chest pain.     Patient has been seeing Dr. Cameron for left shoulder pain. She had an injection to the left shoulder in September of this year and was subsequently placed on medrol dose packs.  Patient has been on two courses of steroids orally which sounds like a medrol pack since her intraarticular injection. Denies fever.  The patient states she severe pain of the shoulder worse after her corticosteroid injection. Patient states fluid was removed from the joint. The patient denies redness.  Patient is on warfarin.  She did not stop warfarin before her shoulder injection.     Patient having stomach issues and lost weight. Admits to nausea and epigastric discomfort. Suspect this may be gastritis from frequent oral steroid use.     Review of Systems   Constitutional:  Negative for chills and fever.   HENT:  Negative for sore throat.    Respiratory:  Negative for cough and shortness of breath.    Cardiovascular:  Negative for chest pain.   Gastrointestinal:  Positive for abdominal pain and nausea. Negative for blood in stool, constipation, diarrhea and vomiting.   Genitourinary:  Negative for hematuria.   Musculoskeletal:  Positive for arthralgias (left shoulder pain).   Skin:  Negative for rash.   Neurological:  Negative for dizziness, syncope and light-headedness.       Objective   BP (!) 138/91 (BP Location: Left arm, Patient Position: Sitting, BP Cuff Size: Adult)   Pulse 83   Temp 36.4 °C (97.5 °F)   Ht 1.524 m (5')   Wt 58.7 kg (129 lb 6.4 oz)   BMI 25.27 kg/m²     Physical Exam  Vitals and nursing note reviewed.   Constitutional:       General: She is not in acute distress.     Appearance: Normal appearance. She is not toxic-appearing or diaphoretic.   HENT:      Head: Normocephalic  and atraumatic.      Mouth/Throat:      Mouth: Mucous membranes are moist.      Pharynx: Oropharynx is clear. No oropharyngeal exudate.   Cardiovascular:      Rate and Rhythm: Normal rate. Rhythm irregular.   Pulmonary:      Effort: Pulmonary effort is normal. No respiratory distress.      Breath sounds: Normal breath sounds. No wheezing, rhonchi or rales.   Abdominal:      General: Bowel sounds are normal. There is no distension.      Palpations: Abdomen is soft. There is no mass.      Tenderness: There is no abdominal tenderness. There is no guarding or rebound.   Musculoskeletal:         General: Swelling (left shoulder) and tenderness (left lateral anterior shoulder, swelling) present.      Cervical back: Neck supple.      Right lower leg: No edema.      Left lower leg: No edema.   Skin:     General: Skin is warm and dry.      Coloration: Skin is not jaundiced or pale.      Findings: No erythema or rash.   Neurological:      Mental Status: She is alert. Mental status is at baseline.   Psychiatric:         Mood and Affect: Mood normal.         Behavior: Behavior normal.         Assessment/Plan   Problem List Items Addressed This Visit           ICD-10-CM    Anemia D64.9    Relevant Orders    CBC and Auto Differential (Completed)    Serum Protein Electrophoresis + Immunofixation (Completed)    Vitamin B12 (Completed)    Folate (Completed)    Shoulder pain - Primary M25.519    Relevant Orders    MR shoulder left w and wo IV contrast    Sedimentation Rate (Completed)    C-reactive protein (Completed)    Referral to Orthopaedic Surgery    Thrombocytopenia (CMS-HCC) D69.6    Relevant Orders    CBC and Auto Differential (Completed)    Serum Protein Electrophoresis + Immunofixation (Completed)    Vitamin B12 (Completed)    Folate (Completed)    Swelling of joint, shoulder, left M25.412    Relevant Orders    MR shoulder left w and wo IV contrast    Sedimentation Rate (Completed)    C-reactive protein (Completed)     Referral to Orthopaedic Surgery    Acute gastritis without hemorrhage K29.00     Anemia: Recheck CBC, serum protein electrophoresis, vitamin B12 and folate level    Shoulder pain: MRI has been ordered of the left shoulder as well as CRP ESR and referral to orthopedic surgery.  I will give her lidocaine patches for her pain    Thrombocytopenia: Will check CBC serum protein electrophoresis, vitamin B12 level and folate level no signs or symptoms of bleeding.    Acute gastritis without hemorrhage: We will start the patient on pantoprazole

## 2024-11-21 ENCOUNTER — LAB (OUTPATIENT)
Dept: LAB | Facility: LAB | Age: 84
End: 2024-11-21
Payer: MEDICARE

## 2024-11-21 ENCOUNTER — APPOINTMENT (OUTPATIENT)
Dept: CARDIOLOGY | Facility: CLINIC | Age: 84
End: 2024-11-21
Payer: MEDICARE

## 2024-11-21 VITALS
SYSTOLIC BLOOD PRESSURE: 110 MMHG | OXYGEN SATURATION: 94 % | HEART RATE: 72 BPM | DIASTOLIC BLOOD PRESSURE: 58 MMHG | WEIGHT: 130.6 LBS | BODY MASS INDEX: 25.64 KG/M2 | HEIGHT: 60 IN | TEMPERATURE: 98 F

## 2024-11-21 DIAGNOSIS — I10 BENIGN HYPERTENSION: ICD-10-CM

## 2024-11-21 DIAGNOSIS — I05.9 MITRAL VALVE DISEASE: ICD-10-CM

## 2024-11-21 DIAGNOSIS — M25.412 SWELLING OF JOINT, SHOULDER, LEFT: ICD-10-CM

## 2024-11-21 DIAGNOSIS — I50.32 CHRONIC DIASTOLIC CONGESTIVE HEART FAILURE: ICD-10-CM

## 2024-11-21 DIAGNOSIS — I77.810 ASCENDING AORTA DILATION (CMS-HCC): ICD-10-CM

## 2024-11-21 DIAGNOSIS — D64.9 ANEMIA, UNSPECIFIED TYPE: ICD-10-CM

## 2024-11-21 DIAGNOSIS — I07.1 TRICUSPID VALVE INSUFFICIENCY, UNSPECIFIED ETIOLOGY: ICD-10-CM

## 2024-11-21 DIAGNOSIS — G89.29 CHRONIC LEFT SHOULDER PAIN: ICD-10-CM

## 2024-11-21 DIAGNOSIS — R01.1 CARDIAC MURMUR: ICD-10-CM

## 2024-11-21 DIAGNOSIS — Z98.890 HISTORY OF MITRAL VALVE REPAIR: Primary | ICD-10-CM

## 2024-11-21 DIAGNOSIS — M25.512 CHRONIC LEFT SHOULDER PAIN: ICD-10-CM

## 2024-11-21 DIAGNOSIS — I51.7 SEVERE LEFT VENTRICULAR HYPERTROPHY: ICD-10-CM

## 2024-11-21 DIAGNOSIS — I50.32 CHRONIC DIASTOLIC HEART FAILURE: ICD-10-CM

## 2024-11-21 DIAGNOSIS — I48.11 LONGSTANDING PERSISTENT ATRIAL FIBRILLATION (MULTI): ICD-10-CM

## 2024-11-21 DIAGNOSIS — Z95.2 S/P MVR (MITRAL VALVE REPLACEMENT): ICD-10-CM

## 2024-11-21 DIAGNOSIS — D69.6 THROMBOCYTOPENIA (CMS-HCC): ICD-10-CM

## 2024-11-21 DIAGNOSIS — I36.1 NONRHEUMATIC TRICUSPID VALVE REGURGITATION: ICD-10-CM

## 2024-11-21 DIAGNOSIS — E78.5 DYSLIPIDEMIA: ICD-10-CM

## 2024-11-21 DIAGNOSIS — Z79.01 ANTICOAGULATED BY ANTICOAGULATION TREATMENT: ICD-10-CM

## 2024-11-21 PROBLEM — T14.8XXA BRUISING: Status: ACTIVE | Noted: 2024-09-30

## 2024-11-21 PROBLEM — I48.91 ATRIAL FIBRILLATION (MULTI): Status: ACTIVE | Noted: 2023-10-23

## 2024-11-21 PROBLEM — M25.519 SHOULDER PAIN: Status: ACTIVE | Noted: 2024-01-23

## 2024-11-21 PROBLEM — I50.9 CHF (CONGESTIVE HEART FAILURE): Status: RESOLVED | Noted: 2023-10-23 | Resolved: 2024-11-21

## 2024-11-21 LAB
ANION GAP SERPL CALC-SCNC: 12 MMOL/L (ref 10–20)
BASOPHILS # BLD AUTO: 0.02 X10*3/UL (ref 0–0.1)
BASOPHILS NFR BLD AUTO: 0.3 %
BUN SERPL-MCNC: 18 MG/DL (ref 6–23)
CALCIUM SERPL-MCNC: 9 MG/DL (ref 8.6–10.3)
CHLORIDE SERPL-SCNC: 105 MMOL/L (ref 98–107)
CO2 SERPL-SCNC: 26 MMOL/L (ref 21–32)
CREAT SERPL-MCNC: 0.79 MG/DL (ref 0.5–1.05)
CRP SERPL-MCNC: 4.37 MG/DL
EGFRCR SERPLBLD CKD-EPI 2021: 75 ML/MIN/1.73M*2
EOSINOPHIL # BLD AUTO: 0.04 X10*3/UL (ref 0–0.4)
EOSINOPHIL NFR BLD AUTO: 0.6 %
ERYTHROCYTE [DISTWIDTH] IN BLOOD BY AUTOMATED COUNT: 13.2 % (ref 11.5–14.5)
ERYTHROCYTE [SEDIMENTATION RATE] IN BLOOD BY WESTERGREN METHOD: 52 MM/H (ref 0–30)
FOLATE SERPL-MCNC: 14.3 NG/ML
GLUCOSE SERPL-MCNC: 94 MG/DL (ref 74–99)
HCT VFR BLD AUTO: 31.9 % (ref 36–46)
HGB BLD-MCNC: 10.1 G/DL (ref 12–16)
IMM GRANULOCYTES # BLD AUTO: 0.02 X10*3/UL (ref 0–0.5)
IMM GRANULOCYTES NFR BLD AUTO: 0.3 % (ref 0–0.9)
LYMPHOCYTES # BLD AUTO: 1.66 X10*3/UL (ref 0.8–3)
LYMPHOCYTES NFR BLD AUTO: 25.8 %
MCH RBC QN AUTO: 34.4 PG (ref 26–34)
MCHC RBC AUTO-ENTMCNC: 31.7 G/DL (ref 32–36)
MCV RBC AUTO: 109 FL (ref 80–100)
MONOCYTES # BLD AUTO: 0.72 X10*3/UL (ref 0.05–0.8)
MONOCYTES NFR BLD AUTO: 11.2 %
NEUTROPHILS # BLD AUTO: 3.97 X10*3/UL (ref 1.6–5.5)
NEUTROPHILS NFR BLD AUTO: 61.8 %
NRBC BLD-RTO: 0 /100 WBCS (ref 0–0)
PLATELET # BLD AUTO: 265 X10*3/UL (ref 150–450)
POTASSIUM SERPL-SCNC: 4.1 MMOL/L (ref 3.5–5.3)
PROT SERPL-MCNC: 6.7 G/DL (ref 6.4–8.2)
RBC # BLD AUTO: 2.94 X10*6/UL (ref 4–5.2)
SODIUM SERPL-SCNC: 139 MMOL/L (ref 136–145)
VIT B12 SERPL-MCNC: 499 PG/ML (ref 211–911)
WBC # BLD AUTO: 6.4 X10*3/UL (ref 4.4–11.3)

## 2024-11-21 PROCEDURE — 1123F ACP DISCUSS/DSCN MKR DOCD: CPT | Performed by: INTERNAL MEDICINE

## 2024-11-21 PROCEDURE — 84165 PROTEIN E-PHORESIS SERUM: CPT

## 2024-11-21 PROCEDURE — 86334 IMMUNOFIX E-PHORESIS SERUM: CPT

## 2024-11-21 PROCEDURE — 85652 RBC SED RATE AUTOMATED: CPT

## 2024-11-21 PROCEDURE — 1036F TOBACCO NON-USER: CPT | Performed by: INTERNAL MEDICINE

## 2024-11-21 PROCEDURE — 86140 C-REACTIVE PROTEIN: CPT

## 2024-11-21 PROCEDURE — 3074F SYST BP LT 130 MM HG: CPT | Performed by: INTERNAL MEDICINE

## 2024-11-21 PROCEDURE — 1160F RVW MEDS BY RX/DR IN RCRD: CPT | Performed by: INTERNAL MEDICINE

## 2024-11-21 PROCEDURE — 84155 ASSAY OF PROTEIN SERUM: CPT

## 2024-11-21 PROCEDURE — 80048 BASIC METABOLIC PNL TOTAL CA: CPT

## 2024-11-21 PROCEDURE — 36415 COLL VENOUS BLD VENIPUNCTURE: CPT

## 2024-11-21 PROCEDURE — 85025 COMPLETE CBC W/AUTO DIFF WBC: CPT

## 2024-11-21 PROCEDURE — 84165 PROTEIN E-PHORESIS SERUM: CPT | Performed by: INTERNAL MEDICINE

## 2024-11-21 PROCEDURE — 3078F DIAST BP <80 MM HG: CPT | Performed by: INTERNAL MEDICINE

## 2024-11-21 PROCEDURE — 82607 VITAMIN B-12: CPT

## 2024-11-21 PROCEDURE — 86320 SERUM IMMUNOELECTROPHORESIS: CPT | Performed by: INTERNAL MEDICINE

## 2024-11-21 PROCEDURE — 1159F MED LIST DOCD IN RCRD: CPT | Performed by: INTERNAL MEDICINE

## 2024-11-21 PROCEDURE — 82746 ASSAY OF FOLIC ACID SERUM: CPT

## 2024-11-21 PROCEDURE — 99205 OFFICE O/P NEW HI 60 MIN: CPT | Performed by: INTERNAL MEDICINE

## 2024-11-21 PROCEDURE — 93000 ELECTROCARDIOGRAM COMPLETE: CPT | Performed by: INTERNAL MEDICINE

## 2024-11-21 ASSESSMENT — ENCOUNTER SYMPTOMS
WHEEZING: 0
ABDOMINAL PAIN: 1
WEAKNESS: 1
EYES NEGATIVE: 1
DYSURIA: 1
DIZZINESS: 0
ENDOCRINE NEGATIVE: 1
DECREASED CONCENTRATION: 0
FATIGUE: 1
LIGHT-HEADEDNESS: 0
ARTHRALGIAS: 1
SLEEP DISTURBANCE: 1
SHORTNESS OF BREATH: 1
NERVOUS/ANXIOUS: 0
BRUISES/BLEEDS EASILY: 1
COUGH: 0
STRIDOR: 0
NUMBNESS: 1

## 2024-11-21 NOTE — PROGRESS NOTES
"  Patient:  Kymberly Layne  YOB: 1940  MRN: [id number]       Chief Complaint/Active Symptoms:       Kymberly Layne is a 84 y.o. female who returns today for cardiac follow-up.    Patient is here to re-establish with cardiology. Last seen 3 years ago. Used to follow with Dr. Scott. Had MV repair in 1995 and has been doing well. Denies history of CHF. No history of MI. Has history of a fib that she feels is paroxysmal but is not always aware of her a fib. Is chronically anticoagulated on warfarin. Patient feels more comfortable to remaining on warfarin than changing to any DOAC.     Has no symptoms. No chest pain. Has some SOB on exertion without orthopnea or PND. Mostly SOB with climbing stairs. Has prn use of Lasix for lower extremity edema. Does not she has more SOB with activities when she has edema. No syncope or near syncope.     Review of Systems   Constitutional:  Positive for fatigue.   HENT:  Positive for hearing loss.    Eyes: Negative.    Respiratory:  Positive for shortness of breath. Negative for cough, wheezing and stridor.    Cardiovascular:  Positive for leg swelling. Negative for chest pain.   Gastrointestinal:  Positive for abdominal pain (upset stomach\").   Endocrine: Negative.    Genitourinary:  Positive for dysuria (history of UTI - nothing currently).   Musculoskeletal:  Positive for arthralgias.   Neurological:  Positive for weakness and numbness (bilateral feet - intermittently). Negative for dizziness and light-headedness.   Hematological:  Bruises/bleeds easily.   Psychiatric/Behavioral:  Positive for sleep disturbance. Negative for decreased concentration. The patient is not nervous/anxious.    All other systems reviewed and are negative.      Objective:     Vitals:    11/21/24 0915   BP: 110/58   Pulse: 72   Temp: 36.7 °C (98 °F)   SpO2: 94%       Vitals:    11/21/24 0915   Weight: 59.2 kg (130 lb 9.6 oz)       Allergies:     Allergies   Allergen Reactions    Cefuroxime Unknown "    Sulfa (Sulfonamide Antibiotics) Unknown and Rash          Medications:     Current Outpatient Medications   Medication Instructions    betaxoloL (KERLONE) 20 mg, oral, Daily    cholecalciferol (Vitamin D-3) 5,000 Units tablet 1 capsule, Daily RT    furosemide (Lasix) 20 mg tablet Take 1 tablet by mouth daily as needed for leg swelling    lidocaine (Lidoderm) 5 % patch 1 patch, transdermal, Daily, Apply to painful area 12 hours per day, remove for 12 hours.    lisinopril 5 mg, oral, Daily    pantoprazole (PROTONIX) 40 mg, oral, Daily before breakfast, Do not crush, chew, or split.    triamcinolone (Kenalog) 0.1 % cream Apply to affected areas on body twice daily until clear.    warfarin (COUMADIN) 10 mg, oral, Once,  DAILY AS DIRECTED       Physical Examination:   GENERAL:  Well developed, well nourished, in no acute distress.  HEENT: NC AT, EOMI with anicteric sclera  NECK:  Supple, elevated JVD with prominent v wave, no bruit.  CHEST:  Symmetric and nontender.  LUNGS:  Clear to auscultation bilaterally, normal respiratory effort.  HEART:  Irregularly irregular with normal S1 and S2, no S3. Soft holosystolic murmur LLSB, prominent neck V wave  ABDOMEN: Soft, NT, ND without palpable organomegaly or bruits  EXTREMITIES:  Warm with good color, no clubbing or cyanosis.  There is no edema noted.  PERIPHERAL VASCULAR:  Pulses present and equally palpable; 2+ throughout.  MUSCULOSKELETAL: OA changes  NEURO/PSYCH:  Alert and oriented times three with approppriate behavior and responses. Nonfocal motor examination with normal gait and ambulation  Lymph: No significant palpable lymphadenopathy  Skin: no rash or lesions on exposed skin or reported.    Lab:     CBC:   Lab Results   Component Value Date    WBC 4.4 09/03/2024    RBC 3.37 (L) 09/03/2024    HGB 11.5 (L) 09/03/2024    HCT 35.9 (L) 09/03/2024     (L) 09/03/2024        CMP:    Lab Results   Component Value Date     09/03/2024    K 4.3 09/03/2024    CL  "105 09/03/2024    CO2 30 09/03/2024    BUN 28 (H) 09/03/2024    CREATININE 0.76 09/03/2024    GLUCOSE 100 (H) 09/03/2024    CALCIUM 9.5 09/03/2024       Magnesium:    No results found for: \"MG\"    Lipid Profile:    Lab Results   Component Value Date    TRIG 73 09/03/2024    HDL 48.5 09/03/2024    LDLCALC 76 09/03/2024       TSH:    Lab Results   Component Value Date    TSH 1.22 09/03/2024       BNP:   No results found for: \"BNP\"     PT/INR:    No results found for: \"PROTIME\", \"INR\"    HgBA1c:    Lab Results   Component Value Date    HGBA1C 5.5 09/03/2024       BMP:  Lab Results   Component Value Date     09/03/2024     03/06/2024     11/03/2021     07/06/2020     04/15/2020    K 4.3 09/03/2024    K 4.4 03/06/2024    K 4.8 11/03/2021    K 4.5 07/06/2020    K 4.6 04/15/2020     09/03/2024     03/06/2024     11/03/2021     07/06/2020     04/15/2020    CO2 30 09/03/2024    CO2 29 03/06/2024    CO2 31 11/03/2021    CO2 32 07/06/2020    CO2 29 04/15/2020    BUN 28 (H) 09/03/2024    BUN 23 03/06/2024    BUN 22 11/03/2021    BUN 18 07/06/2020    BUN 21 04/15/2020    CREATININE 0.76 09/03/2024    CREATININE 0.79 03/06/2024    CREATININE 0.68 11/03/2021    CREATININE 0.78 07/06/2020    CREATININE 0.77 04/15/2020       Cardiac Enzymes:    No results found for: \"TROPHS\"    Hepatic Function Panel:    Lab Results   Component Value Date    ALKPHOS 63 09/03/2024    ALT 15 09/03/2024    AST 29 09/03/2024    PROT 6.9 09/03/2024    BILITOT 1.0 09/03/2024         Diagnostic Studies:     Transthoracic Echo (TTE) Complete    Result Date: 9/17/2024           [facility] [address], [place] [address] TRANSTHORACIC ECHOCARDIOGRAM REPORT Patient Name:      VIDHI BETHEA          Reading Physician:    [id number] MD Study Date:        9/17/2024             Ordering Provider:    [id number] " CHARLY ANN MRN/PID:           [id number]              Fellow: Accession#:        [id number]          Nurse: Date of Birth/Age: 1940 / 84 years  Sonographer:          Elly Jiménez RDMS, RCS, RVS Gender:            F                     Additional Staff: Height:            154.94 cm             Admit Date: Weight:            61.24 kg              Admission Status: BSA / BMI:         1.60 m2 / 25.51 kg/m2 Department Location:  [facility] Blood Pressure: 142 /80 mmHg Study Type:    TRANSTHORACIC ECHO (TTE) COMPLETE Diagnosis/ICD: Presence of prosthetic heart valve-Z95.2; Mitral valve                disorder-I05.9; Cardiac murmur, unspecified-R01.1 Indication:    MV repair#32 Max Ring (1995), Murmur, MV disease, AFib, CHF,                Edema CPT Codes:     Echo Complete w Full Doppler-[id number]  Study Detail: The following Echo studies were performed: 2D, M-Mode, Doppler and               color flow.  PHYSICIAN INTERPRETATION: Left Ventricle: Left ventricular ejection fraction is normal, by visual estimate at 60-65%. There are no regional wall motion abnormalities. The left ventricular cavity size is normal. There is severe concentric left ventricular hypertrophy. Left ventricular diastolic filling was indeterminate. Left Atrium: The left atrium is severely dilated. Right Ventricle: The right ventricle is normal in size. There is normal right ventricular global systolic function. Right Atrium: The right atrium is normal in size. Aortic Valve: The aortic valve is trileaflet. There is no evidence of aortic valve stenosis. The aortic valve dimensionless index is 0.41. There is mild aortic valve regurgitation. The peak instantaneous gradient of the aortic valve is 7.8 mmHg. The mean gradient of the aortic valve  is 5.0 mmHg. Mitral Valve: The mitral valve is abnormal. There is mild mitral valve regurgitation. The mitral regurgitant orifice area is 48 mm2. The mitral regurgitant volume is 102.73 ml. MV with posterior leaflet repair and annuloplasty ring (Max #32) Anterior MV leaflet thickened. Tricuspid Valve: The tricuspid valve is abnormal. There is moderate to severe tricuspid regurgitation. TV regurgitation eccentric toward interatrial septum, severity of TR may be under estimated. Pulmonic Valve: The pulmonic valve is not well visualized. The pulmonic valve regurgitation was not well visualized. Pericardium: No pericardial effusion noted. Aorta: The aortic root is abnormal. There is moderate dilatation of the ascending aorta. Ascending aorta measures 4.0cm. Compared prior study of 4/1/2021, likely no significant changes.  CONCLUSIONS:  1. Left ventricular ejection fraction is normal, by visual estimate at 60-65%.  2. Left ventricular diastolic filling was indeterminate.  3. There is severe concentric left ventricular hypertrophy.  4. There is normal right ventricular global systolic function.  5. The left atrium is severely dilated.  6. MV with posterior leaflet repair and annuloplasty ring (Max #32)     Anterior MV leaflet thickened.  7. Moderate to severe tricuspid regurgitation.  8. TV regurgitation eccentric toward interatrial septum, severity of TR may be under estimated.  9. Aortic valve stenosis is not present. 10. Mild aortic valve regurgitation. 11. Ascending aorta measures 4.0cm.     Compared prior study of 4/1/2021, likely no significant changes. 12. There is moderate dilatation of the ascending aorta. QUANTITATIVE DATA SUMMARY:  2D MEASUREMENTS:            Normal Ranges: Ao Root d:       4.00 cm    (2.0-3.7cm) LAs:             6.90 cm    (2.7-4.0cm) RVIDd:           1.94 cm    (0.9-3.6cm) IVSd:            1.59 cm    (0.6-1.1cm) LVPWd:           1.38 cm    (0.6-1.1cm) LVIDd:           4.91 cm     (3.9-5.9cm) LVIDs:           3.29 cm LV Mass Index:   193.4 g/m2 LV % FS          33.0 %  AORTA MEASUREMENTS:         Normal Ranges: Asc Ao, d:          4.00 cm (2.1-3.4cm)  LV SYSTOLIC FUNCTION BY 2D PLANIMETRY (MOD):                      Normal Ranges: EF-A4C View:    67 % (>=55%) EF-A2C View:    65 % EF-Biplane:     62 % EF-Visual:      63 % LV EF Reported: 63 %  LV DIASTOLIC FUNCTION:           Normal Ranges: MV Peak E:             0.78 m/s  (0.7-1.2 m/s) MV e'                  0.100 m/s (>8.0) MV lateral e'          0.13 m/s MV medial e'           0.07 m/s E/e' Ratio:            7.84      (<8.0)  MITRAL VALVE:           Normal Ranges: MV Vmax:      1.96 m/s  (<=1.3m/s) MV peak PG:   15.4 mmHg (<5mmHg) MV mean P.0 mmHg  (<48mmHg)  MITRAL INSUFFICIENCY:             Normal Ranges: PISA Radius:          0.9 cm MR VTI:               216.00 cm MR Vmax:              412.00 cm/s MR Alias Joshua:         38.5 cm/s MR Volume:            102.73 ml MR Flow Rt:           195.94 ml/s MR EROA:              48 mm2  AORTIC VALVE:                     Normal Ranges: AoV Vmax:                1.40 m/s (<=1.7m/s) AoV Peak P.8 mmHg (<20mmHg) AoV Mean P.0 mmHg (1.7-11.5mmHg) LVOT Max Joshua:            0.70 m/s (<=1.1m/s) AoV VTI:                 36.50 cm (18-25cm) LVOT VTI:                15.10 cm LVOT Diameter:           1.90 cm  (1.8-2.4cm) AoV Area, VTI:           1.17 cm2 (2.5-5.5cm2) AoV Area,Vmax:           1.42 cm2 (2.5-4.5cm2) AoV Dimensionless Index: 0.41  AORTIC INSUFFICIENCY: AI Vmax:       3.60 m/s AI Half-time:  414 msec AI Decel Rate: 247.40 cm/s2  TRICUSPID VALVE/RVSP:          Normal Ranges: Peak TR Velocity:     2.76 m/s Est. RA Pressure:     3 mmHg RV Syst Pressure:     33 mmHg  (< 30mmHg)  PULMONIC VALVE:          Normal Ranges: PV Max Joshua:     0.5 m/s  (0.6-0.9m/s) PV Max P.0 mmHg  [id number] MD Electronically signed on 2024 at 3:05:17 PM  ** Final **       No nuclear medicine results found for the past 12 months    No valid procedures specified.    EKG:   EKG today - A fib with controlled ventricular response. LVH with QRS widening and repolarization abnormality. Criteria for sepal MI    Radiology:     No orders to display     ASSESSMENT     Problem List Items Addressed This Visit       Benign hypertension    Relevant Orders    Basic Metabolic Panel    RESOLVED: CHF (congestive heart failure)    Relevant Orders    Follow Up In Cardiology    Dyslipidemia    RESOLVED: S/P MVR (mitral valve replacement)    RESOLVED: Mitral valve disease    Atrial fibrillation (Multi)    Relevant Orders    ECG 12 Lead (Completed)    Anticoagulated by anticoagulation treatment    History of mitral valve repair - Primary    Relevant Orders    ECG 12 Lead (Completed)    Follow Up In Cardiology    RESOLVED: Cardiac murmur    Nonrheumatic tricuspid valve regurgitation    Ascending aorta dilation (CMS-HCC)    Relevant Orders    CT angio chest w and wo IV contrast    Follow Up In Cardiology    Basic Metabolic Panel    Severe left ventricular hypertrophy    Relevant Orders    CT angio chest w and wo IV contrast    Chronic diastolic heart failure    Relevant Orders    Basic Metabolic Panel       PLAN     1.  Chronic diastolic heart failure.  Patient is stable and compensated with the use of as needed furosemide.  She has been labeled this because she has had shortness of breath but all of the findings behind it are unclear.  I would not argue with the diagnosis given the findings of her echocardiogram and her clinical examination.  2.  History of mitral valve repair.  She has a Max ring not a mitral valve replacement and this is an appropriate diagnosis and been removed from her chart.  She had a repair in 1995 for mitral valve regurgitation.  It is held up and she has only mild mitral regurgitation at this time.  3.  Moderate to severe tricuspid valve regurgitation.  This was described  as an eccentric jet and could have been underestimated.  The good news is the patient does not have RV enlargement or RV dysfunction and estimated pulmonary pressures are not elevated.  It may be appropriate to do a transesophageal echocardiogram and consider trying cuspid valve clip if her she has severe TR to avoid progressive heart failure and RV dysfunction.  The patient has voiced her opinion that she does not want any surgeries or extra things done so given the long-term consequences of significant tricuspid valve regurgitation I have asked her to examine this with her family as timely intervention may be appropriate and if left untreated by the time she starts having symptoms, it may be too late.  4.  Severe left ventricular hypertrophy.  As patient does not have any significant hypertension this would lead me to believe she may have hypertrophic cardiomyopathy.  I do not believe that she has amyloid as there is nothing abnormal described and she clearly has a very large Folz on her EKG.  With her diastolic heart failure this could be considered but right now I am not going to go into an invasive evaluation or extensive diagnosis or testing until we have her follow-up conversation.  5.  Dilated ascending aorta.  Her aortic root and ascending aorta were found to be dilated at 4 cm which is mild dilation.  I have requested a CT angiogram of the chest to more accurately evaluate the size of her aorta and we can determine how often it needs to be followed.  6.  Longstanding persistent atrial fibrillation on chronic warfarin anticoagulation.  She is tolerating her warfarin anticoagulation and her heart rates for atrial fibrillation are controlled.  We discussed transitioning to a newer medication and she is not interested at this time.  7.  Dyslipidemia.  Conservative medical management at this age.  If she is found to have significant coronary calcifications at the time of her CT angiography would discuss statin  therapy but primary prevention is unclear in patients over the age of 75.    Will see her back in follow-up in 1 month after CT angiography we will talk about whether she wishes to pursue further evaluation of her tricuspid valve regurgitation.

## 2024-11-21 NOTE — PATIENT INSTRUCTIONS
CT angiography to look at aortic size  Get labs - kidney function - done 1 week prior to CT scan  See me after CT scan    Consider if you want to pursue further testing of your tricuspid valve

## 2024-11-22 DIAGNOSIS — I50.32 CHRONIC DIASTOLIC CONGESTIVE HEART FAILURE: ICD-10-CM

## 2024-11-22 RX ORDER — FUROSEMIDE 20 MG/1
TABLET ORAL
Qty: 90 TABLET | Refills: 3 | Status: SHIPPED | OUTPATIENT
Start: 2024-11-22

## 2024-11-25 LAB
ALBUMIN: 3.5 G/DL (ref 3.4–5)
ALPHA 1 GLOBULIN: 0.5 G/DL (ref 0.2–0.6)
ALPHA 2 GLOBULIN: 0.8 G/DL (ref 0.4–1.1)
BETA GLOBULIN: 0.8 G/DL (ref 0.5–1.2)
GAMMA GLOBULIN: 1.2 G/DL (ref 0.5–1.4)
IMMUNOFIXATION COMMENT: NORMAL
PATH REVIEW - SERUM IMMUNOFIXATION: NORMAL
PATH REVIEW-SERUM PROTEIN ELECTROPHORESIS: NORMAL
PROTEIN ELECTROPHORESIS COMMENT: NORMAL

## 2024-11-26 ENCOUNTER — APPOINTMENT (OUTPATIENT)
Dept: PRIMARY CARE | Facility: CLINIC | Age: 84
End: 2024-11-26
Payer: MEDICARE

## 2024-12-10 ENCOUNTER — TELEPHONE (OUTPATIENT)
Dept: PRIMARY CARE | Facility: CLINIC | Age: 84
End: 2024-12-10
Payer: MEDICARE

## 2024-12-10 NOTE — TELEPHONE ENCOUNTER
"Patient dtr, Mackenzie called and stated that her mother is having an MRI of the shoulder tomorrow.  She stated that in 1995 she had \"retained temporary epicardial pacing wires\".  I confirmed with her that she she still has these and she said yes.  She wants to know if she can have the MRI tomorrow.  OK to [facility] for Mackenzie if she does not answer.  "

## 2024-12-11 ENCOUNTER — APPOINTMENT (OUTPATIENT)
Dept: RADIOLOGY | Facility: CLINIC | Age: 84
End: 2024-12-11
Payer: MEDICARE

## 2024-12-11 DIAGNOSIS — I48.91 ATRIAL FIBRILLATION, UNSPECIFIED TYPE (MULTI): ICD-10-CM

## 2024-12-11 RX ORDER — BETAXOLOL 20 MG/1
1 TABLET, FILM COATED ORAL DAILY
Qty: 90 TABLET | Refills: 1 | Status: SHIPPED | OUTPATIENT
Start: 2024-12-11

## 2024-12-16 ENCOUNTER — APPOINTMENT (OUTPATIENT)
Dept: PRIMARY CARE | Facility: CLINIC | Age: 84
End: 2024-12-16
Payer: MEDICARE

## 2024-12-16 VITALS
DIASTOLIC BLOOD PRESSURE: 80 MMHG | BODY MASS INDEX: 24.74 KG/M2 | HEART RATE: 78 BPM | HEIGHT: 60 IN | TEMPERATURE: 97.2 F | SYSTOLIC BLOOD PRESSURE: 128 MMHG | WEIGHT: 126 LBS

## 2024-12-16 DIAGNOSIS — D64.9 ANEMIA, UNSPECIFIED TYPE: Primary | ICD-10-CM

## 2024-12-16 DIAGNOSIS — M25.512 CHRONIC LEFT SHOULDER PAIN: ICD-10-CM

## 2024-12-16 DIAGNOSIS — I10 BENIGN HYPERTENSION: ICD-10-CM

## 2024-12-16 DIAGNOSIS — G89.29 CHRONIC LEFT SHOULDER PAIN: ICD-10-CM

## 2024-12-16 DIAGNOSIS — R73.03 PREDIABETES: ICD-10-CM

## 2024-12-16 DIAGNOSIS — D75.89 MACROCYTOSIS: ICD-10-CM

## 2024-12-16 DIAGNOSIS — E55.9 VITAMIN D DEFICIENCY: ICD-10-CM

## 2024-12-16 PROCEDURE — 99213 OFFICE O/P EST LOW 20 MIN: CPT | Performed by: INTERNAL MEDICINE

## 2024-12-16 PROCEDURE — 1123F ACP DISCUSS/DSCN MKR DOCD: CPT | Performed by: INTERNAL MEDICINE

## 2024-12-16 PROCEDURE — 3079F DIAST BP 80-89 MM HG: CPT | Performed by: INTERNAL MEDICINE

## 2024-12-16 PROCEDURE — 1160F RVW MEDS BY RX/DR IN RCRD: CPT | Performed by: INTERNAL MEDICINE

## 2024-12-16 PROCEDURE — 1159F MED LIST DOCD IN RCRD: CPT | Performed by: INTERNAL MEDICINE

## 2024-12-16 PROCEDURE — 1158F ADVNC CARE PLAN TLK DOCD: CPT | Performed by: INTERNAL MEDICINE

## 2024-12-16 PROCEDURE — G2211 COMPLEX E/M VISIT ADD ON: HCPCS | Performed by: INTERNAL MEDICINE

## 2024-12-16 PROCEDURE — 3074F SYST BP LT 130 MM HG: CPT | Performed by: INTERNAL MEDICINE

## 2024-12-16 ASSESSMENT — ENCOUNTER SYMPTOMS
COUGH: 0
CONFUSION: 0
VOMITING: 0
BLOOD IN STOOL: 0
CHILLS: 0
FEVER: 0
AGITATION: 0
SHORTNESS OF BREATH: 0
NAUSEA: 0
CONSTIPATION: 0
LIGHT-HEADEDNESS: 0
DIZZINESS: 0
SORE THROAT: 0
DIARRHEA: 0
DYSURIA: 0

## 2024-12-16 NOTE — PROGRESS NOTES
Subjective   Patient ID: Kymberly Layne is a 84 y.o. female who presents for Follow-up (Lab review).    HPI Patient states her shoulder is better in terms of pain, swelling, and redness. Patient's insurance didn't cover the lidocaine patches.  She is scheduled to see orthopedics in January.  Patient here today for review of blood work. Patient had blood work on 11/21/2024.  CBC showed hemoglobin 10.1, white blood cell count 6.4, platelets 265,000.  She has chronic anemia for at least the last 5 years.  Patient had normal protein electrophoresis.  Vitamin B12 was normal.  She did have mild increase in inflammatory markers with sed rate of 52 and CRP of 4.37.  Denies any fever, chills, chest pain shortness of breath, nausea, and diarrhea abdominal pain.  We discussed obtaining repeat labs in June.    Review of Systems   Constitutional:  Positive for fatigue. Negative for chills and fever.   HENT:  Negative for sore throat.    Eyes:  Negative for visual disturbance.   Respiratory:  Negative for cough and shortness of breath.    Cardiovascular:  Negative for chest pain and leg swelling.   Gastrointestinal:  Negative for abdominal pain, blood in stool, constipation, diarrhea, nausea and vomiting.   Genitourinary:  Negative for dysuria.   Musculoskeletal:  Positive for arthralgias.   Skin:  Negative for rash.   Neurological:  Negative for dizziness, syncope and light-headedness.   Psychiatric/Behavioral:  Negative for agitation and confusion.        Objective   /80 (BP Location: Left arm, Patient Position: Sitting, BP Cuff Size: Adult)   Pulse 78   Temp 36.2 °C (97.2 °F)   Ht 1.524 m (5')   Wt 57.2 kg (126 lb)   BMI 24.61 kg/m²     Physical Exam  Vitals and nursing note reviewed.   Constitutional:       General: She is not in acute distress.     Appearance: Normal appearance. She is normal weight. She is not ill-appearing, toxic-appearing or diaphoretic.   HENT:      Head: Normocephalic and atraumatic.      Nose:  No rhinorrhea.      Mouth/Throat:      Mouth: Mucous membranes are moist.      Pharynx: Oropharynx is clear.   Eyes:      Extraocular Movements: Extraocular movements intact.      Pupils: Pupils are equal, round, and reactive to light.   Cardiovascular:      Rate and Rhythm: Normal rate. Rhythm irregular.      Heart sounds: Normal heart sounds.   Pulmonary:      Effort: Pulmonary effort is normal. No respiratory distress.      Breath sounds: Normal breath sounds. No wheezing, rhonchi or rales.   Abdominal:      General: There is no distension.      Palpations: Abdomen is soft. There is no mass.      Tenderness: There is no abdominal tenderness. There is no guarding.   Musculoskeletal:      Cervical back: Neck supple.      Right lower leg: No edema.      Left lower leg: No edema.   Skin:     General: Skin is warm and dry.      Findings: No rash.   Neurological:      General: No focal deficit present.      Mental Status: She is alert and oriented to person, place, and time. Mental status is at baseline.   Psychiatric:         Mood and Affect: Mood normal.         Behavior: Behavior normal.         Thought Content: Thought content normal.         Judgment: Judgment normal.         Assessment/Plan   Problem List Items Addressed This Visit           ICD-10-CM    Anemia - Primary D64.9    Relevant Orders    CBC and Auto Differential    Iron and TIBC    Ferritin    Slide Request    Benign hypertension I10    Relevant Orders    Comprehensive metabolic panel    Vitamin D deficiency E55.9    Relevant Orders    Vitamin D 25-Hydroxy,Total (for eval of Vitamin D levels)    Prediabetes R73.03    Macrocytosis D75.89    Relevant Orders    CBC and Auto Differential    Shoulder pain M25.519     Anemia: Will continue to monitor will check CBC iron and ferritin level on slide request.  Denies any bleeding.  Patient is on warfarin.    Hypertension: Chronic, stable.  Continue to monitor    Vitamin D deficiency: Check a vitamin D  level    Macrocytosis: She is had normal protein electrophoresis and normal vitamin B12 level.    Chronic left shoulder pain.  Patient is scheduled to see orthopedic surgery.

## 2025-01-10 ENCOUNTER — APPOINTMENT (OUTPATIENT)
Dept: PRIMARY CARE | Facility: CLINIC | Age: 85
End: 2025-01-10
Payer: MEDICARE

## 2025-01-16 NOTE — PROGRESS NOTES
History: Kymberly is here for her left shoulder.  She has had increased shoulder pain over the last year.  She had an injection at an outside facility in September which actually things worse.  She did try some home exercises.  She can only take Tylenol as she is on Coumadin for her heart issues.    Past medical history: Multiple  Medications: Multiple  Allergies: No known drug allergies    Please refer to the intake H&P regarding the patient's review of systems, family history and social history as was done today    HEENT: Normal  Lungs: Clear to auscultation  Heart: RRR  Abdomen: Soft, nontender  Skin: clear  Extremity: She has limited abduction to 50 degrees.  Forward flexion is the same.  She has decent passive motion at the side.  There is crepitus with rotation.  Mild swelling about the shoulder today.  No numbness or tingling.  Contralateral exam is normal for strength, motion, stability and neurovascular assessment.    Radiographs: X-rays show a high riding humeral head consistent with a rotator cuff arthropathy.    Assessment: Left shoulder rotator cuff arthropathy    Plan: We discussed several options including arthroplasty but she does not feel she is yet ready for any surgery.  She is leery about injections given her increased pain after her last shot.  She would like to try some gentle therapy exercises and was given a brochure on reverse arthroplasty.  She can call if she would like to pursue surgery or an injection.  I will see her back at her discretion.  All questions were answered today with the patient.    This note was generated with voice recognition software and may contain grammatical errors.    Scribe Attestation  By signing my name below, ISonya Scribe   attest that this documentation has been prepared under the direction and in the presence of Paramjit Ott MD.

## 2025-01-17 ENCOUNTER — HOSPITAL ENCOUNTER (OUTPATIENT)
Dept: RADIOLOGY | Facility: CLINIC | Age: 85
Discharge: HOME | End: 2025-01-17
Payer: MEDICARE

## 2025-01-17 ENCOUNTER — OFFICE VISIT (OUTPATIENT)
Dept: ORTHOPEDIC SURGERY | Facility: CLINIC | Age: 85
End: 2025-01-17
Payer: MEDICARE

## 2025-01-17 DIAGNOSIS — M25.412 SWELLING OF JOINT, SHOULDER, LEFT: ICD-10-CM

## 2025-01-17 DIAGNOSIS — M25.512 CHRONIC LEFT SHOULDER PAIN: ICD-10-CM

## 2025-01-17 DIAGNOSIS — G89.29 CHRONIC LEFT SHOULDER PAIN: ICD-10-CM

## 2025-01-17 DIAGNOSIS — S46.012A TRAUMATIC COMPLETE TEAR OF LEFT ROTATOR CUFF, INITIAL ENCOUNTER: Primary | ICD-10-CM

## 2025-01-17 PROCEDURE — 73030 X-RAY EXAM OF SHOULDER: CPT | Mod: LT

## 2025-01-17 PROCEDURE — 99213 OFFICE O/P EST LOW 20 MIN: CPT | Performed by: ORTHOPAEDIC SURGERY

## 2025-03-13 ENCOUNTER — PATIENT OUTREACH (OUTPATIENT)
Dept: PRIMARY CARE | Facility: CLINIC | Age: 85
End: 2025-03-13
Payer: MEDICARE

## 2025-03-13 NOTE — PROGRESS NOTES
Discharge Facility:  [facility]    Discharge Diagnosis:  S/P laparoscopic cholecystectomy with intraoperative cholangiogram  Acute cholecystitis   Klebsiella pneumoniae urinary tract infection   Admission Date:  3/9/25  Discharge Date:   3/12/25    PCP Appointment Date:  TBD-Message sent to office staff requesting assistance.     Specialist Appointment Date:   TBD-[facility] Encounter and Summary Linked: Yes  Scanned Document with Generic Provider Scanning (03/09/2025)     Mar 09  Hospital  Unknown with CHARLY ANN; MAIKEL SINGH; OLIMPIA CORCORAN; HEIDI VERDUZCO; FAN BARRERA; HUBER DAMICO   See discharge assessment below for further details  Wrap Up  Wrap Up Additional Comments: Successful transition of care outreach within 2 business days of discharge. CM introduced myself and the TCM program.   CM gave my contact information and encouraged to call if needing assistance or has any further non-emergent questions prior to my next outreach.   Reviewed hospital stay and answered any questions. Daughter/Caregiver denies any further discharge questions/needs at this time. (3/13/2025 10:36 AM)    Engagement  Call Start Time: 0000 (contact number listed is for Daughter/caregiver) (3/13/2025 10:36 AM)    Medications  Medications reviewed with patient/caregiver?: Yes (3/13/2025 10:36 AM)  Is the patient having any side effects they believe may be caused by any medication additions or changes?: No (3/13/2025 10:36 AM)  Does the patient have all medications ordered at discharge?: Yes (3/13/2025 10:36 AM)  Prescription Comments: acetaminophen-oxycodone 325 mg-5 mg oral tablet = Percocet 1 tabs, ORAL, [id number], PRN PRN for pain, # 20 tabs, Refill(s) 0, Date: 3/11/25 -Keflex 500 mg oral capsule 500 mg 1 caps, ORAL, [id number], 30 caps, Date: 3/12/25 (3/13/2025 10:36 AM)  Is the patient taking all medications as directed (includes completed medication regime)?:  Yes (3/13/2025 10:36 AM)  Care Management Interventions: Provided patient education (3/13/2025 10:36 AM)  Medication Comments: -CM discussed referencing discharge paperwork to follow detailed daily medication schedule. Caregiver endorses understanding & compliance with medications. (3/13/2025 10:36 AM)    Appointments  Does the patient have a primary care provider?: Yes (3/13/2025 10:36 AM)  Care Management Interventions: Advised patient to make appointment (daughter aware I have asked clerical staff to reach out to schedule appt with Dr Salgado) (3/13/2025 10:36 AM)  Has the patient kept scheduled appointments due by today?: Yes (3/13/2025 10:36 AM)  Care Management Interventions: Advised to schedule with specialist (daughter has message into Dr Brock office and waiting for call back to get appt time for surgery follow up) (3/13/2025 10:36 AM)    Self Management  Has home health visited the patient within 72 hours of discharge?: Not applicable (3/13/2025 10:36 AM)  What Durable Medical Equipment (DME) was ordered?: na (3/13/2025 10:36 AM)    Patient Teaching  Does the patient have access to their discharge instructions?: Yes (3/13/2025 10:36 AM)  Care Management Interventions: Reviewed instructions with patient; Educated on MyChart (Active Mychart) (3/13/2025 10:36 AM)  What is the patient's perception of their health status since discharge?: Same (3/13/2025 10:36 AM)  Is the patient/caregiver able to teach back the hierarchy of who to call/visit for symptoms/problems? PCP, Specialist, Home Health nurse, Urgent Care, ED, 911: Yes (3/13/2025 10:36 AM)  Patient/Caregiver Education Comments: Daughter aware for need of frequent ambulation for the prevention of VTE as well as use of incentive spirometry. Confirmed spirometry device was given to her to take home. (3/13/2025 10:36 AM)

## 2025-04-04 ENCOUNTER — PATIENT OUTREACH (OUTPATIENT)
Dept: PRIMARY CARE | Facility: CLINIC | Age: 85
End: 2025-04-04
Payer: MEDICARE

## 2025-04-04 NOTE — PROGRESS NOTES
Unable to reach patient for call back after recent hospitalization.   M with call back number for patient's daughter Mackenzie to call if needed  to assist with any questions or concerns patient may have. Main phone number listed is daughter's cell.     Patient has active [employer] User. DaughterMackenzie aware next outreach will be thru [employer] but can reach out me at [phone] if any non emergent needs arise.

## 2025-04-07 ENCOUNTER — APPOINTMENT (OUTPATIENT)
Dept: PRIMARY CARE | Facility: CLINIC | Age: 85
End: 2025-04-07
Payer: MEDICARE

## 2025-04-09 ENCOUNTER — APPOINTMENT (OUTPATIENT)
Dept: DERMATOLOGY | Facility: CLINIC | Age: 85
End: 2025-04-09
Payer: MEDICARE

## 2025-04-14 ENCOUNTER — APPOINTMENT (OUTPATIENT)
Dept: SURGERY | Facility: CLINIC | Age: 85
End: 2025-04-14
Payer: MEDICARE

## 2025-04-18 ENCOUNTER — APPOINTMENT (OUTPATIENT)
Dept: ORTHOPEDIC SURGERY | Facility: HOSPITAL | Age: 85
End: 2025-04-18
Payer: MEDICARE

## 2025-04-18 ENCOUNTER — OFFICE VISIT (OUTPATIENT)
Dept: PRIMARY CARE | Facility: CLINIC | Age: 85
End: 2025-04-18
Payer: MEDICARE

## 2025-04-18 VITALS
BODY MASS INDEX: 24.35 KG/M2 | DIASTOLIC BLOOD PRESSURE: 66 MMHG | HEART RATE: 57 BPM | HEIGHT: 60 IN | SYSTOLIC BLOOD PRESSURE: 148 MMHG | WEIGHT: 124 LBS

## 2025-04-18 DIAGNOSIS — M54.31 SCIATICA OF RIGHT SIDE: Primary | ICD-10-CM

## 2025-04-18 PROCEDURE — 1159F MED LIST DOCD IN RCRD: CPT | Performed by: FAMILY MEDICINE

## 2025-04-18 PROCEDURE — 3077F SYST BP >= 140 MM HG: CPT | Performed by: FAMILY MEDICINE

## 2025-04-18 PROCEDURE — 1123F ACP DISCUSS/DSCN MKR DOCD: CPT | Performed by: FAMILY MEDICINE

## 2025-04-18 PROCEDURE — 1158F ADVNC CARE PLAN TLK DOCD: CPT | Performed by: FAMILY MEDICINE

## 2025-04-18 PROCEDURE — 99213 OFFICE O/P EST LOW 20 MIN: CPT | Performed by: FAMILY MEDICINE

## 2025-04-18 PROCEDURE — 3078F DIAST BP <80 MM HG: CPT | Performed by: FAMILY MEDICINE

## 2025-04-18 RX ORDER — GABAPENTIN 100 MG/1
100 CAPSULE ORAL 3 TIMES DAILY
Qty: 90 CAPSULE | Refills: 0 | Status: SHIPPED | OUTPATIENT
Start: 2025-04-18 | End: 2025-05-18

## 2025-04-18 ASSESSMENT — ENCOUNTER SYMPTOMS
ACTIVITY CHANGE: 0
FEVER: 0
FATIGUE: 0
DIZZINESS: 0
SHORTNESS OF BREATH: 0
HEADACHES: 0

## 2025-04-18 ASSESSMENT — PATIENT HEALTH QUESTIONNAIRE - PHQ9
1. LITTLE INTEREST OR PLEASURE IN DOING THINGS: NOT AT ALL
2. FEELING DOWN, DEPRESSED OR HOPELESS: NOT AT ALL
SUM OF ALL RESPONSES TO PHQ9 QUESTIONS 1 AND 2: 0

## 2025-04-18 NOTE — PROGRESS NOTES
Subjective   Patient ID: Kymberly Layne is a 84 y.o. female who presents for Sick Visit (left hip pain pt is asking for pain med).    R hip pain   - ongoing for several months   - has been getting gradually worse over the last few weeks   - reports she has not had any previous imaging of the hip   - does feel some instability in her hip and has concerned that her leg might give out   - pain is generally worse when laying in bed or walking, reports most nights at bedtime she has significant pain   - has been taking tylenol which does not help significantly with her pain   - does have some occasional catching and locking in the hip   - patient does take chronic blood thinners   - has had the knee replaced on the R side        Review of Systems   Constitutional:  Negative for activity change, fatigue and fever.   Respiratory:  Negative for shortness of breath.    Cardiovascular:  Negative for chest pain.   Neurological:  Negative for dizziness and headaches.       Objective   /66   Pulse 57   Ht 1.524 m (5')   Wt 56.2 kg (124 lb)   BMI 24.22 kg/m²     Physical Exam  Constitutional:       Appearance: Normal appearance.   Neurological:      Mental Status: She is alert.   Psychiatric:         Mood and Affect: Mood normal.         Behavior: Behavior normal.       Assessment/Plan   Problem List Items Addressed This Visit    None  Visit Diagnoses         Codes      Sciatica of right side    -  Primary M54.31    Relevant Medications    gabapentin (Neurontin) 100 mg capsule    Other Relevant Orders    XR hip right with pelvis when performed 2 or 3 views

## 2025-04-21 ENCOUNTER — HOSPITAL ENCOUNTER (OUTPATIENT)
Dept: RADIOLOGY | Facility: CLINIC | Age: 85
Discharge: HOME | End: 2025-04-21
Payer: MEDICARE

## 2025-04-21 DIAGNOSIS — M54.31 SCIATICA OF RIGHT SIDE: ICD-10-CM

## 2025-04-21 PROCEDURE — 73502 X-RAY EXAM HIP UNI 2-3 VIEWS: CPT | Mod: LT

## 2025-04-21 PROCEDURE — 73502 X-RAY EXAM HIP UNI 2-3 VIEWS: CPT | Mod: LEFT SIDE | Performed by: RADIOLOGY

## 2025-04-23 PROBLEM — M25.412: Status: ACTIVE | Noted: 2025-04-23

## 2025-04-23 PROBLEM — K29.00 ACUTE GASTRITIS WITHOUT HEMORRHAGE: Status: ACTIVE | Noted: 2025-04-23

## 2025-04-24 DIAGNOSIS — I48.91 ATRIAL FIBRILLATION, UNSPECIFIED TYPE (MULTI): ICD-10-CM

## 2025-04-24 RX ORDER — WARFARIN SODIUM 5 MG/1
TABLET ORAL
Qty: 96 TABLET | Refills: 1 | OUTPATIENT
Start: 2025-04-24

## 2025-04-25 ASSESSMENT — ENCOUNTER SYMPTOMS
ARTHRALGIAS: 1
FATIGUE: 1
ABDOMINAL PAIN: 0

## 2025-04-28 ENCOUNTER — APPOINTMENT (OUTPATIENT)
Dept: PRIMARY CARE | Facility: CLINIC | Age: 85
End: 2025-04-28
Payer: MEDICARE

## 2025-04-28 VITALS
TEMPERATURE: 98.1 F | WEIGHT: 124 LBS | DIASTOLIC BLOOD PRESSURE: 74 MMHG | HEART RATE: 65 BPM | SYSTOLIC BLOOD PRESSURE: 150 MMHG | HEIGHT: 60 IN | BODY MASS INDEX: 24.35 KG/M2

## 2025-04-28 DIAGNOSIS — Z90.49 S/P LAPAROSCOPIC CHOLECYSTECTOMY: ICD-10-CM

## 2025-04-28 DIAGNOSIS — G89.29 CHRONIC LEFT HIP PAIN: ICD-10-CM

## 2025-04-28 DIAGNOSIS — I05.9 MITRAL VALVE DISEASE: ICD-10-CM

## 2025-04-28 DIAGNOSIS — I48.91 ATRIAL FIBRILLATION, UNSPECIFIED TYPE (MULTI): ICD-10-CM

## 2025-04-28 DIAGNOSIS — I10 BENIGN HYPERTENSION: ICD-10-CM

## 2025-04-28 DIAGNOSIS — M16.0 PRIMARY OSTEOARTHRITIS OF BOTH HIPS: Primary | ICD-10-CM

## 2025-04-28 DIAGNOSIS — M25.552 CHRONIC LEFT HIP PAIN: ICD-10-CM

## 2025-04-28 DIAGNOSIS — Z95.2 S/P MVR (MITRAL VALVE REPLACEMENT): ICD-10-CM

## 2025-04-28 PROCEDURE — 1159F MED LIST DOCD IN RCRD: CPT | Performed by: INTERNAL MEDICINE

## 2025-04-28 PROCEDURE — 1036F TOBACCO NON-USER: CPT | Performed by: INTERNAL MEDICINE

## 2025-04-28 PROCEDURE — 3077F SYST BP >= 140 MM HG: CPT | Performed by: INTERNAL MEDICINE

## 2025-04-28 PROCEDURE — 1158F ADVNC CARE PLAN TLK DOCD: CPT | Performed by: INTERNAL MEDICINE

## 2025-04-28 PROCEDURE — 99213 OFFICE O/P EST LOW 20 MIN: CPT | Performed by: INTERNAL MEDICINE

## 2025-04-28 PROCEDURE — 1123F ACP DISCUSS/DSCN MKR DOCD: CPT | Performed by: INTERNAL MEDICINE

## 2025-04-28 PROCEDURE — 3078F DIAST BP <80 MM HG: CPT | Performed by: INTERNAL MEDICINE

## 2025-04-28 PROCEDURE — G2211 COMPLEX E/M VISIT ADD ON: HCPCS | Performed by: INTERNAL MEDICINE

## 2025-04-28 PROCEDURE — 1160F RVW MEDS BY RX/DR IN RCRD: CPT | Performed by: INTERNAL MEDICINE

## 2025-04-28 RX ORDER — BETAXOLOL 20 MG/1
1 TABLET, FILM COATED ORAL DAILY
Qty: 90 TABLET | Refills: 1 | Status: SHIPPED | OUTPATIENT
Start: 2025-04-28

## 2025-04-28 ASSESSMENT — ENCOUNTER SYMPTOMS
DYSURIA: 0
COUGH: 0
SORE THROAT: 0
PALPITATIONS: 0
CHILLS: 0
NAUSEA: 0
DIZZINESS: 0
AGITATION: 0
HEMATURIA: 0
SHORTNESS OF BREATH: 0
BLOOD IN STOOL: 0
LIGHT-HEADEDNESS: 0
DIARRHEA: 0
VOMITING: 0
ABDOMINAL PAIN: 0
ARTHRALGIAS: 1
CONFUSION: 0
FEVER: 0

## 2025-04-28 NOTE — PROGRESS NOTES
Subjective   Patient ID: Kymberly Layne is a 84 y.o. female who presents for Hospital Follow-up ([facility] admission from March 9.  She had her gallbladder removed.).  History of Present Illness  Kymberly Layne is an 84 year old female who presents with hip pain and follow-up from recent cholecystectomy.    She is experiencing significant hip pain, particularly on the left side, due to advanced arthritis in both hips, with the left side being more severe. The arthritis has led to joint space narrowing and bone spurring, resulting in 'bone on bone' contact and erosion of the left femoral head. This condition has severely impacted her mobility, especially in the last few weeks.  Patient is interested in referral to an orthopedic specialist who does hip replacements with anterior approach minimally invasive procedures.    She underwent gallbladder removal surgery in March after experiencing persistent abdominal pain, fever, fatigue, and nausea. A CT scan revealed issues with the gallbladder, leading to the surgical intervention. Post-surgery, she reports significant improvement in her symptoms, with no further nausea or vomiting. She has discontinued Protonix, which was initially prescribed for gastrointestinal issues, as she no longer experiences acid reflux or related symptoms. No issues with her surgical incisions, which have healed well without complications such as drainage or redness. She has not experienced any diarrhea post-surgery.    Her past medical history includes a history of kidney stones, for which she follows up with a urologist annually. She also has a history of atrial fibrillation and underwent a mitral valvuloplasty in the mid-1980s. She is currently on betaxolol for blood pressure management and attends a Coumadin clinic regularly.  She does not follow with a cardiologist regularly.  Would like referral to cardiologist at [facility].  We discussed that she would require hip surgery she would  need cardiac clearance by her cardiologist.    Review of Systems   Constitutional:  Negative for chills and fever.   HENT:  Negative for sore throat.    Eyes:  Negative for visual disturbance.   Respiratory:  Negative for cough and shortness of breath.    Cardiovascular:  Negative for chest pain, palpitations and leg swelling.   Gastrointestinal:  Negative for abdominal pain, blood in stool, diarrhea, nausea and vomiting.   Genitourinary:  Negative for dysuria and hematuria.   Musculoskeletal:  Positive for arthralgias.   Skin:  Negative for rash.   Neurological:  Negative for dizziness, syncope and light-headedness.   Psychiatric/Behavioral:  Negative for agitation and confusion.        Objective     /74 (BP Location: Left arm, Patient Position: Sitting, BP Cuff Size: Adult)   Pulse 65   Temp 36.7 °C (98.1 °F)   Ht 1.524 m (5')   Wt 56.2 kg (124 lb)   BMI 24.22 kg/m²      Physical Exam  Vitals and nursing note reviewed.   Constitutional:       General: She is not in acute distress.     Appearance: Normal appearance. She is not ill-appearing, toxic-appearing or diaphoretic.   HENT:      Head: Normocephalic and atraumatic.      Nose: No rhinorrhea.      Mouth/Throat:      Mouth: Mucous membranes are moist.      Pharynx: Oropharynx is clear.   Eyes:      Extraocular Movements: Extraocular movements intact.      Pupils: Pupils are equal, round, and reactive to light.   Cardiovascular:      Rate and Rhythm: Normal rate. Rhythm irregular.      Heart sounds: Murmur heard.   Pulmonary:      Effort: Pulmonary effort is normal. No respiratory distress.      Breath sounds: Normal breath sounds. No wheezing, rhonchi or rales.   Abdominal:      General: Bowel sounds are normal. There is no distension.      Palpations: Abdomen is soft. There is no mass.      Tenderness: There is no abdominal tenderness. There is no guarding.      Comments: Surgical incisions have healed well   Musculoskeletal:      Cervical back: Neck  supple.      Right lower leg: No edema.      Left lower leg: No edema.   Skin:     General: Skin is warm and dry.      Coloration: Skin is not jaundiced or pale.      Findings: No rash.   Neurological:      General: No focal deficit present.      Mental Status: She is alert and oriented to person, place, and time. Mental status is at baseline.      Cranial Nerves: No cranial nerve deficit.   Psychiatric:         Mood and Affect: Mood normal.         Behavior: Behavior normal.         Thought Content: Thought content normal.         Judgment: Judgment normal.        Physical Exam      Assessment & Plan  Bilateral hip osteoarthritis  Advanced osteoarthritis in both hips, more severe on the left side, with significant joint space narrowing and erosion of the left femoral head. Severe pain impacting mobility. Potential candidate for anterior hip replacement.  - Refer to orthopedic specialist for evaluation and management options, including potential anterior hip replacement.  - Consider joint injections for pain management if recommended by orthopedics.  - Discuss insurance coverage and potential orthopedic specialists, including Doctor Garza and others at .    Atrial fibrillation  Atrial fibrillation is controlled.  Denies history of myocardial infarction. history of mitral valvuloplasty in the mid-1980s. Last echocardiogram showed a significantly enlarged right atrium. Dissatisfaction with previous cardiologist has led to no recent follow-up. Potential need for cardiology clearance if hip surgery is pursued.  - Consider referral to a general cardiologist for routine management and potential pre-surgical clearance if hip surgery is pursued.  - Discuss potential cardiologists at [facility] or other convenient locations.    Hypertension  Blood pressure is elevated today, likely due to non-adherence to antihypertensive medication. Currently on betaxolol for management.  -She forgot to take her medication today.  -  Refill betaxolol prescription.    Mitral valvuloplasty  Mitral valvuloplasty performed in the mid-1980s. No current issues reported related to the procedure.    Cholecystectomy  Recent cholecystectomy in March due to abdominal pain and cholecystitis. surgery was successful with no complications. Symptoms have resolved post-surgery.  - Monitor for potential diarrhea after fatty meals due to cholecystectomy.    Nephrolithiasis  History of kidney stones with annual follow-up with a urologist.  - Continue annual follow-up with urologist.    Results  LABS  Sodium: slightly low 134  Potassium: 4.0 mmol/L  Blood glucose: slightly elevated  Kidney function: slightly decreased  Liver enzymes: normal  Bilirubin: normal  Pancreatic enzymes: normal        Problem List Items Addressed This Visit       Benign hypertension    Atrial fibrillation (Multi)    Relevant Medications    betaxoloL (Kerlone) 20 mg tablet    Other Relevant Orders    Referral to Cardiology    Primary osteoarthritis of both hips - Primary    Relevant Orders    Referral to Orthopedics and Sports Medicine    Chronic left hip pain    Relevant Orders    Referral to Orthopedics and Sports Medicine    S/P MVR (mitral valve replacement)    Relevant Orders    Referral to Cardiology    Mitral valve disease    Relevant Medications    betaxoloL (Kerlone) 20 mg tablet    Other Relevant Orders    Referral to Cardiology    S/P laparoscopic cholecystectomy         Fredi Salgado,      This medical note was created with the assistance of artificial intelligence (AI) for documentation purposes. The content has been reviewed and confirmed by the healthcare provider for accuracy and completeness. Patient consented to the use of audio recording and use of AI during their visit.

## 2025-05-01 NOTE — TELEPHONE ENCOUNTER
----- Message from Pawan Morales sent at 4/25/2025  8:10 AM EDT -----  XR shows significant arthritis in the hip, but otherwise no major changes  ----- Message -----  From: Interface, Radiology Results In  Sent: 4/22/2025   6:59 PM EDT  To: Pawan Morales MD

## 2025-05-06 ENCOUNTER — PATIENT OUTREACH (OUTPATIENT)
Dept: PRIMARY CARE | Facility: CLINIC | Age: 85
End: 2025-05-06
Payer: MEDICARE

## 2025-05-08 ENCOUNTER — TELEPHONE (OUTPATIENT)
Dept: PRIMARY CARE | Facility: CLINIC | Age: 85
End: 2025-05-08
Payer: MEDICARE

## 2025-05-08 DIAGNOSIS — R26.2 UNABLE TO AMBULATE: ICD-10-CM

## 2025-05-08 DIAGNOSIS — M16.0 PRIMARY OSTEOARTHRITIS OF BOTH HIPS: Primary | ICD-10-CM

## 2025-05-08 DIAGNOSIS — I50.32 CHRONIC DIASTOLIC CONGESTIVE HEART FAILURE: ICD-10-CM

## 2025-05-08 NOTE — TELEPHONE ENCOUNTER
Patients dtr, Mackenzie arlin called and LVM that she is no longer mobile and in CHF, and asking for an order for a wheelchair.  They need the wheelchair to get her to see the Cardiologist.   P:  [phone] ok to LVM

## 2025-06-02 ENCOUNTER — PATIENT OUTREACH (OUTPATIENT)
Dept: PRIMARY CARE | Facility: CLINIC | Age: 85
End: 2025-06-02
Payer: MEDICARE

## 2025-06-02 NOTE — PROGRESS NOTES
Discharge Facility:  [facility]    Discharge Diagnosis:  UTI due to Klebsiella species    Hypotension (Discharge Diagnosis) - 5/26/25  Dehydration (Discharge Diagnosis) - 5/26/25  Generalized weakness (Discharge Diagnosis) - 5/26/25  Hydronephrosis, right (Discharge Diagnosis) - 5/26/25  Hydroureter (Discharge Diagnosis) - 5/26/25   Admission Date:  5/26/25  Discharge Date:   5/31/25    PCP Appointment Date:  Appointment with Charly Salgado DO (06/12/2025)     Appointment with Charly Salgado DO (07/14/2025)   Specialist Appointment Date:   6/11/25 cardio  TBD:  With: HEIDI FERREIRA, Urology  Address:  [address] [place] [address]  [id number] [phone]    Hospital Encounter and Summary Linked: Yes  May 26  Unknown with RENE SCHULTZ; MACHO LEMUS; CHARLY SALGADO; FAN BARRERA; ANTONIO MCCORMACK; DIONNE HAYNES     Two attempts were made to reach patient within two business days after discharge. I left voicemail to introduce myself and the TCM program to Kymberly Layne. I encouraged patient/daughter to keep follow up appt. I gave my contact information to return my call so we can go over discharge instructions and see if I can assist in anyway.

## 2025-06-04 ENCOUNTER — TELEPHONE (OUTPATIENT)
Dept: PRIMARY CARE | Facility: CLINIC | Age: 85
End: 2025-06-04
Payer: MEDICARE

## 2025-06-04 NOTE — TELEPHONE ENCOUNTER
Patient dtr Marilyn called P:  [phone].  She states that patient was recently hospitalized for sepsis and had 2 stents placed.  She was on IV abx.  She is now home and on Cipro.  She has been having frequent watery diarrhea and is asking to be tested for C Diff.  Please advise.

## 2025-06-06 DIAGNOSIS — Z79.2 ANTIBIOTIC LONG-TERM USE: ICD-10-CM

## 2025-06-06 DIAGNOSIS — R19.7 WATERY DIARRHEA: Primary | ICD-10-CM

## 2025-06-10 ENCOUNTER — TELEPHONE (OUTPATIENT)
Dept: PRIMARY CARE | Facility: CLINIC | Age: 85
End: 2025-06-10
Payer: MEDICARE

## 2025-06-10 NOTE — TELEPHONE ENCOUNTER
Patient dtrMarilyn called back.  She stated that she went to pick the supplies to test for C Diff but the order says clinic collect.  They need the order changed to lab collect.

## 2025-06-12 ENCOUNTER — APPOINTMENT (OUTPATIENT)
Dept: PRIMARY CARE | Facility: CLINIC | Age: 85
End: 2025-06-12
Payer: MEDICARE

## 2025-06-12 VITALS
TEMPERATURE: 98.2 F | DIASTOLIC BLOOD PRESSURE: 58 MMHG | BODY MASS INDEX: 22.78 KG/M2 | HEART RATE: 61 BPM | WEIGHT: 116 LBS | SYSTOLIC BLOOD PRESSURE: 100 MMHG | HEIGHT: 60 IN

## 2025-06-12 DIAGNOSIS — Z09 HOSPITAL DISCHARGE FOLLOW-UP: Primary | ICD-10-CM

## 2025-06-12 DIAGNOSIS — A41.9 SEPSIS, DUE TO UNSPECIFIED ORGANISM, UNSPECIFIED WHETHER ACUTE ORGAN DYSFUNCTION PRESENT (MULTI): ICD-10-CM

## 2025-06-12 DIAGNOSIS — I48.11 LONGSTANDING PERSISTENT ATRIAL FIBRILLATION (MULTI): ICD-10-CM

## 2025-06-12 DIAGNOSIS — I07.1 TRICUSPID VALVE INSUFFICIENCY, UNSPECIFIED ETIOLOGY: ICD-10-CM

## 2025-06-12 DIAGNOSIS — Z23 ENCOUNTER FOR IMMUNIZATION: ICD-10-CM

## 2025-06-12 DIAGNOSIS — N39.0 URINARY TRACT INFECTION WITHOUT HEMATURIA, SITE UNSPECIFIED: ICD-10-CM

## 2025-06-12 DIAGNOSIS — I10 BENIGN HYPERTENSION: ICD-10-CM

## 2025-06-12 PROCEDURE — 3078F DIAST BP <80 MM HG: CPT | Performed by: INTERNAL MEDICINE

## 2025-06-12 PROCEDURE — G0009 ADMIN PNEUMOCOCCAL VACCINE: HCPCS | Performed by: INTERNAL MEDICINE

## 2025-06-12 PROCEDURE — 99495 TRANSJ CARE MGMT MOD F2F 14D: CPT | Performed by: INTERNAL MEDICINE

## 2025-06-12 PROCEDURE — 3074F SYST BP LT 130 MM HG: CPT | Performed by: INTERNAL MEDICINE

## 2025-06-12 PROCEDURE — 1159F MED LIST DOCD IN RCRD: CPT | Performed by: INTERNAL MEDICINE

## 2025-06-12 PROCEDURE — 90677 PCV20 VACCINE IM: CPT | Performed by: INTERNAL MEDICINE

## 2025-06-12 PROCEDURE — 1160F RVW MEDS BY RX/DR IN RCRD: CPT | Performed by: INTERNAL MEDICINE

## 2025-06-12 RX ORDER — VALSARTAN 40 MG/1
40 TABLET ORAL DAILY
COMMUNITY
Start: 2025-05-06

## 2025-06-12 RX ORDER — EMPAGLIFLOZIN 10 MG/1
10 TABLET, FILM COATED ORAL DAILY
COMMUNITY
Start: 2025-05-06

## 2025-06-12 RX ORDER — SPIRONOLACTONE 25 MG/1
25 TABLET ORAL DAILY
COMMUNITY
Start: 2025-05-06

## 2025-06-12 ASSESSMENT — ENCOUNTER SYMPTOMS
ABDOMINAL PAIN: 0
SHORTNESS OF BREATH: 0
LIGHT-HEADEDNESS: 0
CHILLS: 0
BLOOD IN STOOL: 0
VOMITING: 0
CONFUSION: 0
PALPITATIONS: 0
FEVER: 0
AGITATION: 0
HEMATURIA: 0
DIARRHEA: 0
DYSURIA: 0
COUGH: 0
NAUSEA: 0
DIZZINESS: 0
SORE THROAT: 0

## 2025-06-12 NOTE — PROGRESS NOTES
"Patient: Kymberly Layne  : 1940  PCP: Fredi Salgado DO  MRN: [id number]  Program: Transitional Care Management  Status: Enrolled  Effective Dates: 2025 - present  Responsible Staff: Mackenzie Seals LPN  Social Drivers to be Addressed: No information to display         Kymberly Layne is a 84 y.o. female presenting today for follow-up after being discharged from the hospital 12 days ago. The main problem requiring admission was urosepsis. The discharge summary and/or Transitional Care Management documentation was reviewed. Medication reconciliation was performed as indicated via the \"Garrett as Reviewed\" timestamp.     Kymberly Layne was contacted by Transitional Care Management services two days after her discharge. This encounter and supporting documentation was reviewed.    /58 (BP Location: Right arm, Patient Position: Sitting, BP Cuff Size: Adult)   Pulse 61   Temp 36.8 °C (98.2 °F)   Ht 1.524 m (5')   Wt 52.6 kg (116 lb)   BMI 22.65 kg/m²     History of Present Illness  The patient, with heart failure, presents for follow-up after recent hospitalization for urosepsis.    She was hospitalized from May 26 to May 31 for urosepsis due to a urinary tract infection caused by Klebsiella species. During her hospitalization, hydronephrosis and hydroureter were identified, and bilateral ureteral stents were placed. She has a history of ureteropelvic junction obstruction on the right side, which may have contributed to her condition. She was discharged on oral Cipro for 10 days. No current fever, chills, or burning with urination. She feels better since her hospitalization, although she experienced significant weakness and dehydration during her illness.    She has a history of heart failure and was evaluated by a cardiologist in May. She was started on valsartan, spironolactone, daily Lasix, and Jardiance. The cardiologist noted she was carrying about ten pounds of water weight. She is scheduled " for a stress test and amyloid screening to further evaluate her cardiac status. She has an upcoming appointment in the congestive heart failure clinic in July.    She underwent gallbladder removal in March and has been experiencing loose bowel movements since then. No fever, abdominal pain, or significant diarrhea consistent with C. diff infection. She was on IV antibiotics during her hospital stay and continued with oral Cipro upon discharge.    She has been evaluated for hip replacement and needs cardiac clearance before proceeding. She has a follow-up with the urologist next week regarding her ureteral stents.    Review of Systems   Constitutional:  Negative for chills and fever.   HENT:  Negative for sore throat.    Eyes:  Negative for visual disturbance.   Respiratory:  Negative for cough and shortness of breath.    Cardiovascular:  Negative for chest pain, palpitations and leg swelling.   Gastrointestinal:  Negative for abdominal pain, blood in stool, diarrhea, nausea and vomiting.   Genitourinary:  Negative for dysuria and hematuria.   Skin:  Negative for rash.   Neurological:  Negative for dizziness, syncope and light-headedness.   Psychiatric/Behavioral:  Negative for agitation and confusion.      12 Systems have been reviewed as follows.  Constitutional: Fever, weight gain, weight loss, appetite change, night sweats, fatigue, chills.  Eyes : blurry, double vision, vision, loss, tearing, redness, pain, sensitivity to light, glaucoma.  Ears, nose, mouth, and throat: Hearing loss, ringing in the ears, ear pain, nasal congestion, nasal drainage, nosebleeds, mouth, throat, irritation tooth problem.  Cardiovascular :chest pain, pressure, heart racing, palpitations, sweating, leg swelling, high or low blood pressure  Pulmonary: Cough, yellow or green sputum, blood and sputum, shortness of breath, wheezing  Gastrointestinal: Nausea, vomiting, diarrhea, constipation, pain, blood in stool, or vomitus, heartburn,  difficulty swallowing  Genitourinary: incontinence, abnormal bleeding, abnormal discharge, urinary frequency, urinary hesitancy, pain, impotence sexual problem, infection, urinary retention  Musculoskeletal: Pain, stiffness, joint, redness or warmth, arthritis, back pain, weakness, muscle wasting, sprain or fracture  Neuro: Weight weakness, dizziness, change in voice, change in taste change in vision, change in hearing, loss, or change of sensation, trouble walking, balance problems coordination problems, shaking, speech problem  Endocrine , cold or heat intolerance, blood sugar problem, weight gain or loss missed periods hot flashes, sweats, change in body hair, change in libido, increased thirst, increased urination  Heme/lymph: Swelling, bleeding, problem anemia, bruising, enlarged lymph nodes  Allergic/immunologic: H. plus nasal drip, watery itchy eyes, nasal drainage, immunosuppressed  The above were reviewed and noted negative except as noted in HPI and Problem List.      Physical Exam  CHEST: Lungs clear to auscultation bilaterally.  Constitutional: Well developed, well nourished, alert and in no acute distress   Eyes: Normal external exam. Pupils equally round and reactive to light with normal accommodation and extraocular movements intact.  Neck: Supple, no lymphadenopathy or masses.   Cardiovascular: Regular rate and rhythm, normal S1 and S2, no murmurs, gallops, or rubs. Radial pulses normal. No peripheral edema.  Pulmonary: No respiratory distress, lungs clear to auscultation bilaterally. No wheezes, rhonchi, rales.  Abdomen: soft,non tender, non distended, without masses or HSM  Skin: Warm, well perfused, normal skin turgor and color.   Neurologic: Cranial nerves II-XII grossly intact.   Psychiatric: Mood calm and affect normal  Musculoskeletal: Moving all extremities without restriction  The above were reviewed and noted negative except as noted in HPI and Problem List.  The complexity of medical  decision making for this patient's transitional care is moderate.      Subjective     Objective     Results  LABS  Blood culture: Positive for Klebsiella species (05/31/2025)  Urine culture: Positive for Klebsiella species (05/31/2025)    DIAGNOSTIC  Echocardiogram: Tricuspid valve regurgitation (03/2025)       Assessment & Plan  Urosepsis  Recent hospitalization for urosepsis secondary to a urinary tract infection caused by Klebsiella species. Bilateral ureteral stents placed due to hydronephrosis and hydroureter, likely secondary to ureteropelvic junction obstruction and possible stone. Discharged on May 31, 2025, after treatment with IV antibiotics and Cipro at home. No current symptoms of fever, chills, or urinary issues. Follow-up with urologist scheduled.  - Continue follow-up with urologist next week.    Congestive Heart Failure  Recently evaluated by a cardiologist who initiated treatment with valsartan, spironolactone, Lasix, and Jardiance due to suspected fluid overload and possible heart failure. Cardiologist suspects she was carrying about ten pounds of water weight. Scheduled for a stress test and amyloid screening to assess for heart blockage or weakened heart. Follow-up in congestive heart failure clinic in July. No current symptoms of chest pain or dyspnea. Cardiologist's evaluation is crucial for surgical clearance for hip replacement.  - Continue current medications: valsartan, spironolactone, Lasix, and Jardiance.  - Complete scheduled stress test and amyloid screening.  - Attend follow-up appointment in congestive heart failure clinic in July.    Cholecystectomy-related Diarrhea  Persistent loose bowel movements since cholecystectomy in March 2025. No symptoms suggestive of C. diff infection such as watery diarrhea, fever, or abdominal pain. Symptoms attributed to post-cholecystectomy changes.  - Monitor bowel movements and report any changes or worsening symptoms.    General Health  Maintenance  Due for tetanus and pneumonia vaccinations. Discussed the importance of staying up to date with vaccinations to prevent infections. Pneumonia vaccination administered today.  - Receive tetanus vaccination in July or August.    Follow-up  Regular follow-up to monitor recovery post-hospitalization and manage ongoing health issues.  - Return for follow-up appointment in July.  - Complete lab work as ordered.      Fredi Salgado, DO     This medical note was created with the assistance of artificial intelligence (AI) for documentation purposes. The content has been reviewed and confirmed by the healthcare provider for accuracy and completeness. Patient consented to the use of audio recording and use of AI during their visit.

## 2025-06-14 LAB
25(OH)D3+25(OH)D2 SERPL-MCNC: 87 NG/ML (ref 30–100)
ALBUMIN SERPL-MCNC: 4.1 G/DL (ref 3.6–5.1)
ALP SERPL-CCNC: 82 U/L (ref 37–153)
ALT SERPL-CCNC: 7 U/L (ref 6–29)
ANION GAP SERPL CALCULATED.4IONS-SCNC: 7 MMOL/L (CALC) (ref 7–17)
AST SERPL-CCNC: 20 U/L (ref 10–35)
BASOPHILS # BLD AUTO: 29 CELLS/UL (ref 0–200)
BASOPHILS NFR BLD AUTO: 0.6 %
BILIRUB SERPL-MCNC: 0.6 MG/DL (ref 0.2–1.2)
BUN SERPL-MCNC: 30 MG/DL (ref 7–25)
CALCIUM SERPL-MCNC: 9.3 MG/DL (ref 8.6–10.4)
CHLORIDE SERPL-SCNC: 101 MMOL/L (ref 98–110)
CO2 SERPL-SCNC: 29 MMOL/L (ref 20–32)
CREAT SERPL-MCNC: 0.98 MG/DL (ref 0.6–0.95)
EGFRCR SERPLBLD CKD-EPI 2021: 58 ML/MIN/1.73M2
EOSINOPHIL # BLD AUTO: 72 CELLS/UL (ref 15–500)
EOSINOPHIL NFR BLD AUTO: 1.5 %
ERYTHROCYTE [DISTWIDTH] IN BLOOD BY AUTOMATED COUNT: 12.3 % (ref 11–15)
FERRITIN SERPL-MCNC: 202 NG/ML (ref 16–288)
GLUCOSE SERPL-MCNC: 107 MG/DL (ref 65–139)
HCT VFR BLD AUTO: 31.2 % (ref 35–45)
HGB BLD-MCNC: 10 G/DL (ref 11.7–15.5)
IRON SATN MFR SERPL: 23 % (CALC) (ref 16–45)
IRON SERPL-MCNC: 74 MCG/DL (ref 45–160)
LYMPHOCYTES # BLD AUTO: 1589 CELLS/UL (ref 850–3900)
LYMPHOCYTES NFR BLD AUTO: 33.1 %
MCH RBC QN AUTO: 33.4 PG (ref 27–33)
MCHC RBC AUTO-ENTMCNC: 32.1 G/DL (ref 32–36)
MCV RBC AUTO: 104.3 FL (ref 80–100)
MONOCYTES # BLD AUTO: 398 CELLS/UL (ref 200–950)
MONOCYTES NFR BLD AUTO: 8.3 %
NEUTROPHILS # BLD AUTO: 2712 CELLS/UL (ref 1500–7800)
NEUTROPHILS NFR BLD AUTO: 56.5 %
PLATELET # BLD AUTO: 241 THOUSAND/UL (ref 140–400)
PMV BLD REES-ECKER: 9.3 FL (ref 7.5–12.5)
POTASSIUM SERPL-SCNC: 4 MMOL/L (ref 3.5–5.3)
PROT SERPL-MCNC: 7.2 G/DL (ref 6.1–8.1)
RBC # BLD AUTO: 2.99 MILLION/UL (ref 3.8–5.1)
SODIUM SERPL-SCNC: 137 MMOL/L (ref 135–146)
TIBC SERPL-MCNC: 320 MCG/DL (CALC) (ref 250–450)
WBC # BLD AUTO: 4.8 THOUSAND/UL (ref 3.8–10.8)

## 2025-06-16 ENCOUNTER — APPOINTMENT (OUTPATIENT)
Dept: PRIMARY CARE | Facility: CLINIC | Age: 85
End: 2025-06-16
Payer: MEDICARE

## 2025-06-17 ENCOUNTER — PATIENT OUTREACH (OUTPATIENT)
Dept: PRIMARY CARE | Facility: CLINIC | Age: 85
End: 2025-06-17
Payer: MEDICARE

## 2025-06-17 NOTE — PROGRESS NOTES
Unable to reach patient for follow up call after recent hospitalization.   Left voicemail with call back number for patient to call if needed  to assist with any questions or concerns patient may have.

## 2025-07-14 ENCOUNTER — APPOINTMENT (OUTPATIENT)
Dept: PRIMARY CARE | Facility: CLINIC | Age: 85
End: 2025-07-14
Payer: MEDICARE

## 2025-07-14 VITALS
SYSTOLIC BLOOD PRESSURE: 125 MMHG | TEMPERATURE: 98.1 F | HEART RATE: 48 BPM | HEIGHT: 60 IN | BODY MASS INDEX: 22.54 KG/M2 | WEIGHT: 114.8 LBS | DIASTOLIC BLOOD PRESSURE: 57 MMHG

## 2025-07-14 DIAGNOSIS — R73.03 PREDIABETES: ICD-10-CM

## 2025-07-14 DIAGNOSIS — I10 BENIGN HYPERTENSION: ICD-10-CM

## 2025-07-14 DIAGNOSIS — Z95.2 S/P MVR (MITRAL VALVE REPLACEMENT): ICD-10-CM

## 2025-07-14 DIAGNOSIS — E55.9 VITAMIN D DEFICIENCY: ICD-10-CM

## 2025-07-14 DIAGNOSIS — I48.91 ATRIAL FIBRILLATION, UNSPECIFIED TYPE (MULTI): Primary | ICD-10-CM

## 2025-07-14 DIAGNOSIS — D75.89 MACROCYTOSIS: ICD-10-CM

## 2025-07-14 DIAGNOSIS — D64.9 ANEMIA, UNSPECIFIED TYPE: ICD-10-CM

## 2025-07-14 DIAGNOSIS — E78.5 DYSLIPIDEMIA: ICD-10-CM

## 2025-07-14 PROCEDURE — 1036F TOBACCO NON-USER: CPT | Performed by: INTERNAL MEDICINE

## 2025-07-14 PROCEDURE — G2211 COMPLEX E/M VISIT ADD ON: HCPCS | Performed by: INTERNAL MEDICINE

## 2025-07-14 PROCEDURE — 1160F RVW MEDS BY RX/DR IN RCRD: CPT | Performed by: INTERNAL MEDICINE

## 2025-07-14 PROCEDURE — 3074F SYST BP LT 130 MM HG: CPT | Performed by: INTERNAL MEDICINE

## 2025-07-14 PROCEDURE — 1159F MED LIST DOCD IN RCRD: CPT | Performed by: INTERNAL MEDICINE

## 2025-07-14 PROCEDURE — 3078F DIAST BP <80 MM HG: CPT | Performed by: INTERNAL MEDICINE

## 2025-07-14 PROCEDURE — 1125F AMNT PAIN NOTED PAIN PRSNT: CPT | Performed by: INTERNAL MEDICINE

## 2025-07-14 PROCEDURE — 99213 OFFICE O/P EST LOW 20 MIN: CPT | Performed by: INTERNAL MEDICINE

## 2025-07-14 RX ORDER — WARFARIN SODIUM 5 MG/1
TABLET ORAL
Qty: 180 TABLET | Refills: 1 | Status: SHIPPED | OUTPATIENT
Start: 2025-07-14

## 2025-07-14 SDOH — ECONOMIC STABILITY: FOOD INSECURITY: WITHIN THE PAST 12 MONTHS, YOU WORRIED THAT YOUR FOOD WOULD RUN OUT BEFORE YOU GOT MONEY TO BUY MORE.: NEVER TRUE

## 2025-07-14 SDOH — ECONOMIC STABILITY: FOOD INSECURITY: WITHIN THE PAST 12 MONTHS, THE FOOD YOU BOUGHT JUST DIDN'T LAST AND YOU DIDN'T HAVE MONEY TO GET MORE.: NEVER TRUE

## 2025-07-14 ASSESSMENT — ENCOUNTER SYMPTOMS
PALPITATIONS: 0
AGITATION: 0
BLOOD IN STOOL: 0
FEVER: 0
ABDOMINAL PAIN: 0
COUGH: 0
CONFUSION: 0
CHILLS: 0
DIARRHEA: 0
LIGHT-HEADEDNESS: 0
DIZZINESS: 0
SHORTNESS OF BREATH: 0
DYSURIA: 0
VOMITING: 0
SORE THROAT: 0
HEMATURIA: 0
NAUSEA: 0

## 2025-07-14 ASSESSMENT — PATIENT HEALTH QUESTIONNAIRE - PHQ9
SUM OF ALL RESPONSES TO PHQ9 QUESTIONS 1 AND 2: 0
1. LITTLE INTEREST OR PLEASURE IN DOING THINGS: NOT AT ALL
1. LITTLE INTEREST OR PLEASURE IN DOING THINGS: NOT AT ALL
2. FEELING DOWN, DEPRESSED OR HOPELESS: NOT AT ALL
SUM OF ALL RESPONSES TO PHQ9 QUESTIONS 1 AND 2: 0
2. FEELING DOWN, DEPRESSED OR HOPELESS: NOT AT ALL

## 2025-07-14 ASSESSMENT — PAIN SCALES - GENERAL: PAINLEVEL_OUTOF10: 8

## 2025-07-14 ASSESSMENT — COLUMBIA-SUICIDE SEVERITY RATING SCALE - C-SSRS
1. IN THE PAST MONTH, HAVE YOU WISHED YOU WERE DEAD OR WISHED YOU COULD GO TO SLEEP AND NOT WAKE UP?: NO
2. HAVE YOU ACTUALLY HAD ANY THOUGHTS OF KILLING YOURSELF?: NO
6. HAVE YOU EVER DONE ANYTHING, STARTED TO DO ANYTHING, OR PREPARED TO DO ANYTHING TO END YOUR LIFE?: NO

## 2025-07-14 NOTE — PROGRESS NOTES
Subjective   Patient ID: Kymberly Layne is a 85 y.o. female who presents for Follow-up.  History of Present Illness  Kymberly Layne is an 85 year old female who presents for follow-up after recent ureteroscopy and ongoing management of heart failure.    She was recently hospitalized for urosepsis and fluid overload, initially attributed to congestive heart failure. During her hospital stay, she was febrile and bacteremic.    Two weeks ago, she underwent a ureteroscopy where a stone on the left side was successfully lasered and fragmented. She currently has bilateral stents, which were replaced during the procedure. A follow-up CT is scheduled for August, with plans for stent removal at that time.    Her medication regimen includes spironolactone, losartan, Jardiance, Coumadin, betaxolol, vitamin D, and triamcinolone. Coumadin dosage is adjusted based on INR checks, with a regimen of one tablet daily except for half a tablet on Tuesdays and Thursdays.  She is following with the Coumadin clinic.    She has a history of anemia, with stable hemoglobin levels around 10.0 g/dL. Iron levels are adequate, and B12 levels were normal when checked last fall. There is no evidence of blood loss, and kidney function is slightly decreased with a GFR of 58.  We had a long discussion about the etiology of her anemia.  Has been ongoing for at least 5 years.  We discussed with the patient further evaluation with hematology and further blood work.  She discussed this with her family present does not want to pursue further workup of this unless there would be changes such as decreased blood counts or new changes in blood counts.    She experiences hip pain, which limits her mobility, particularly when climbing stairs. No significant swelling or shortness of breath recently. She is cautious about using NSAIDs due to her Coumadin use.  We had discussed referral to orthopedic specialist for possible joint injection but she does not want to  pursue further treatment for this at this time.    Her recent stress test and echocardiogram were performed; the patient was told by her providers that the results were normal. She has been managing her weight, having lost about ten pounds since April, which was a goal to reduce fluid retention.    Review of Systems   Constitutional:  Negative for chills and fever.   HENT:  Negative for sore throat.    Eyes:  Negative for visual disturbance.   Respiratory:  Negative for cough and shortness of breath.    Cardiovascular:  Negative for chest pain, palpitations and leg swelling.   Gastrointestinal:  Negative for abdominal pain, blood in stool, diarrhea, nausea and vomiting.   Genitourinary:  Negative for dysuria and hematuria.   Skin:  Negative for rash.   Neurological:  Negative for dizziness, syncope and light-headedness.   Psychiatric/Behavioral:  Negative for agitation and confusion.        Objective     /57 (BP Location: Right arm, Patient Position: Sitting, BP Cuff Size: Adult)   Pulse (!) 48   Temp 36.7 °C (98.1 °F)   Ht (!) 1.524 m (5')   Wt 52.1 kg (114 lb 12.8 oz)   BMI 22.42 kg/m²      Physical Exam  Vitals and nursing note reviewed.   Constitutional:       General: She is not in acute distress.     Appearance: Normal appearance. She is normal weight. She is not ill-appearing, toxic-appearing or diaphoretic.   HENT:      Head: Normocephalic and atraumatic.      Nose: No rhinorrhea.      Mouth/Throat:      Mouth: Mucous membranes are moist.      Pharynx: Oropharynx is clear.   Eyes:      Extraocular Movements: Extraocular movements intact.      Pupils: Pupils are equal, round, and reactive to light.   Cardiovascular:      Rate and Rhythm: Normal rate. Rhythm irregular.      Heart sounds: Normal heart sounds.   Pulmonary:      Effort: Pulmonary effort is normal. No respiratory distress.      Breath sounds: Normal breath sounds. No wheezing, rhonchi or rales.   Abdominal:      General: There is no  distension.      Palpations: Abdomen is soft. There is no mass.      Tenderness: There is no abdominal tenderness. There is no guarding.   Musculoskeletal:      Cervical back: Neck supple.      Right lower leg: No edema.      Left lower leg: No edema.   Skin:     General: Skin is warm and dry.      Coloration: Skin is not jaundiced or pale.      Findings: No rash.   Neurological:      General: No focal deficit present.      Mental Status: She is alert and oriented to person, place, and time. Mental status is at baseline.   Psychiatric:         Mood and Affect: Mood normal.         Behavior: Behavior normal.         Thought Content: Thought content normal.         Judgment: Judgment normal.        Physical Exam  MEASUREMENTS: Weight- 114 lbs.  CARDIOVASCULAR: Irregular rhythm consistent with atrial fibrillation.    Assessment & Plan  Ureteropelvic junction obstruction with kidney stones  Underwent ureteroscopy two weeks ago with successful laser lithotripsy of a hollow stone on the left side. Bilateral stents were placed and will be evaluated with a follow-up CT in August for potential removal. The procedure was uncomplicated, and the stone was easily fragmented.  - Schedule follow-up CT in August to assess stents and potential removal.    Congestive Heart Failure  Fluid overload with recent weight loss of ten pounds since April, indicating improved fluid status. Missed appointment with heart failure clinic due to ureteroscopy. Current medications include Valsartan, Spironolactone, and Lasix. Recent stress test showed normal myocardial perfusion and normal left ventricular size and systolic function. Concerns about fluid retention and medication necessity were discussed, with plans to follow up with the heart failure clinic for potential medication adjustments.  - Reschedule appointment with heart failure clinic.  - Continue Valsartan, Spironolactone, and Lasix.    Atrial Fibrillation  Managed with Coumadin. INR was  low at last check, but advised to maintain current dosing regimen. Followed by Coumadin clinic for dosing adjustments. Discussed the importance of maintaining consistent vitamin K intake to avoid fluctuations in INR.  - Refill Coumadin prescription.  - Continue current dosing regimen as advised by Coumadin clinic.    Anemia  Chronic anemia with hemoglobin around 10.0 g/dL. Iron levels are normal, and no evidence of blood loss. Anemia may be related to slightly decreased kidney function. No current interest in hematology referral unless condition worsens.  - Monitor hemoglobin levels.  - Consider hematology referral if anemia worsens or other blood cell lines are affected.    Hip Pain  Experiences hip pain limiting mobility. NSAIDs are contraindicated due to bleeding risk with Coumadin. Reluctant to pursue cortisone injections due to previous adverse reaction in the shoulder. Discussed potential use of topical NSAIDs like diclofenac or Voltaren gel as they are less systemically absorbed.  - Consider topical NSAIDs like diclofenac or Voltaren gel for pain management.    General Health Maintenance  Up to date on vaccinations except for tetanus booster, which is due as it has been ten years since the last dose. Discussed the importance of tetanus booster for protection against infections from cuts or scrapes.  - Administer tetanus booster.    Follow-up  Scheduled for follow-up in December after lab work. Blood work to be done at [facility] with printed orders provided.  - Schedule follow-up appointment in mid-December after lab work.  - Provide lab work orders for December.    Results  LABS  Hb: 10.0  Cr: 0.98  GFR: 58  Creatinine clearance: 36    DIAGNOSTIC  Ureteroscopy: Stone lasered, hollow, broke up easily (07/01/2025)  Echocardiogram: Ejection fraction 55-60%, normal, no mass or vegetation, left ventricular cavity small, left atrium severely dilated (06/2025)  Nuclear stress test: Normal myocardial perfusion  images, normal left ventricular size and systolic function, abnormal EKG at baseline with no further changes (07/07/2025)    Problem List Items Addressed This Visit    None        Fredi Salgado, DO     This medical note was created with the assistance of artificial intelligence (AI) for documentation purposes. The content has been reviewed and confirmed by the healthcare provider for accuracy and completeness. Patient consented to the use of audio recording and use of AI during their visit.

## 2025-07-23 ENCOUNTER — PATIENT OUTREACH (OUTPATIENT)
Dept: PRIMARY CARE | Facility: CLINIC | Age: 85
End: 2025-07-23
Payer: MEDICARE

## 2025-07-23 NOTE — PROGRESS NOTES
Discharge Facility:  [facility]  Discharge Diagnosis:  Acute cystitis with hematuria   Weakness   UTI (urinary tract infection)   Admission Date:  7/19/25  Discharge Date:   7/22/25    PCP Appointment Date:  TBD-I am unable to make an appt due to no openings . Message sent to office staff requesting assistance. As well as encourged patient to call today to schedule.     Specialist Appointment Date:   8/18/2025  3:00 PM   Juliet Camp MD  Urology- stent removal         9/24/2025 10:40 AM   Joanna Rodriguez MD  Cardiology  Hospital Encounter and Summary Linked: Yes  Jul 20 Hospital Encounter    See discharge assessment below for further details  KEY MEDICATION CHANGES:   Keflex 500 mg twice daily to complete 7-day course  Take Lasix only as needed     HOSPITAL COURSE:     This is a 85 year old female admitted with concern for feeling generally unwell and weak along with urinary frequency. Past history of A-fib on Coumadin, mitral valve repair in 1995, chronic HFpEF, bilateral kidney stones s/p left side lithotripsy s/p bilateral ureteral stents with recent exchange 7/1/25, prior Enterococcus UTI. Presentation labs showed creatinine 1.04, bilirubin 1.6. Lactate 0.8, WBC 4.2. Urinalysis with WBC, leuk esterase, nitrates. Tested negative for RSV, flu and COVID. CT imaging shows bilateral double-J ureteral stents with persistent bilateral hydronephrosis mildly improved from prior scan. Bilateral nonobstructing nephrolithiasis in situ. Mild circumferential bladder wall thickening with adjacent haziness. Concern for complicated UTI. She was initiated on IV Zosyn and seen by ID service. Urine culture eventually grew Klebsiella. Ultrasound liver without any biliary dilatation. Low suspicion of cholangitis. Lasix, losartan and Aldactone were on hold in the hospital given mild ALISHA and IV hydration. Blood cultures remained sterile at time of discharge. Antibiotics transitioned to oral Keflex per sensitivities and ID  recommendations. Urology service did not feel the stents are infected and would have her follow-up as outpatient as scheduled.     Two attempts were made to reach patient within two business days after discharge. I left voicemail to introduce myself and the TCM program to Kymberly Layne. I encouraged patient to contact office or myself for follow up appt. I gave my contact information to return my call so we can go over discharge instructions and see if I can assist in anyway.

## 2025-08-06 ENCOUNTER — PATIENT OUTREACH (OUTPATIENT)
Dept: PRIMARY CARE | Facility: CLINIC | Age: 85
End: 2025-08-06
Payer: MEDICARE

## 2025-08-07 ENCOUNTER — OFFICE VISIT (OUTPATIENT)
Dept: PRIMARY CARE | Facility: CLINIC | Age: 85
End: 2025-08-07
Payer: MEDICARE

## 2025-08-07 VITALS
SYSTOLIC BLOOD PRESSURE: 90 MMHG | HEIGHT: 60 IN | BODY MASS INDEX: 21.79 KG/M2 | WEIGHT: 111 LBS | DIASTOLIC BLOOD PRESSURE: 58 MMHG | HEART RATE: 60 BPM

## 2025-08-07 DIAGNOSIS — R35.0 FREQUENT URINATION: ICD-10-CM

## 2025-08-07 LAB
POC APPEARANCE, URINE: ABNORMAL
POC BILIRUBIN, URINE: NEGATIVE
POC BLOOD, URINE: ABNORMAL
POC COLOR, URINE: ABNORMAL
POC GLUCOSE, URINE: ABNORMAL MG/DL
POC KETONES, URINE: NEGATIVE MG/DL
POC LEUKOCYTES, URINE: ABNORMAL
POC NITRITE,URINE: POSITIVE
POC PH, URINE: 6 PH
POC PROTEIN, URINE: ABNORMAL MG/DL
POC SPECIFIC GRAVITY, URINE: 1.02
POC UROBILINOGEN, URINE: 0.2 EU/DL

## 2025-08-07 PROCEDURE — 3078F DIAST BP <80 MM HG: CPT | Performed by: FAMILY MEDICINE

## 2025-08-07 PROCEDURE — 1159F MED LIST DOCD IN RCRD: CPT | Performed by: FAMILY MEDICINE

## 2025-08-07 PROCEDURE — 99213 OFFICE O/P EST LOW 20 MIN: CPT | Performed by: FAMILY MEDICINE

## 2025-08-07 PROCEDURE — 1036F TOBACCO NON-USER: CPT | Performed by: FAMILY MEDICINE

## 2025-08-07 PROCEDURE — 81002 URINALYSIS NONAUTO W/O SCOPE: CPT | Performed by: FAMILY MEDICINE

## 2025-08-07 PROCEDURE — 3074F SYST BP LT 130 MM HG: CPT | Performed by: FAMILY MEDICINE

## 2025-08-07 RX ORDER — CEPHALEXIN 500 MG/1
500 CAPSULE ORAL 4 TIMES DAILY
Qty: 28 CAPSULE | Refills: 0 | Status: SHIPPED | OUTPATIENT
Start: 2025-08-07 | End: 2025-08-14

## 2025-08-07 ASSESSMENT — ENCOUNTER SYMPTOMS
HEADACHES: 0
DIZZINESS: 0
FEVER: 0
FATIGUE: 0
SHORTNESS OF BREATH: 0
ACTIVITY CHANGE: 0

## 2025-08-07 NOTE — PROGRESS NOTES
Subjective   Patient ID: Kymberyl Layne is a 85 y.o. female who presents for Sick Visit (Urinary frequency and burning with urination).    Urinary symptoms   - reports she has been dealing with urinary discomfort for the last 2 days   - reports she has a long history of frequent UTI including a retained stent in the Ureter   - reports she has had burning, pain, urgency and hesitancy   - no fevers/chills, does endorse some nausea          Review of Systems   Constitutional:  Negative for activity change, fatigue and fever.   Respiratory:  Negative for shortness of breath.    Cardiovascular:  Negative for chest pain.   Neurological:  Negative for dizziness and headaches.       Objective   BP 90/58   Pulse 60   Ht (!) 1.524 m (5')   Wt 50.3 kg (111 lb)   BMI 21.68 kg/m²     Physical Exam  Constitutional:       Appearance: Normal appearance.     Cardiovascular:      Rate and Rhythm: Normal rate and regular rhythm.     Neurological:      Mental Status: She is alert.     Psychiatric:         Mood and Affect: Mood normal.         Behavior: Behavior normal.         Assessment/Plan   Problem List Items Addressed This Visit    None  Visit Diagnoses         Codes      Frequent urination     R35.0    stable   - UTI positive   - treat with oral keflex based on last culture (klebsiella)   - repeat culture ordered today     Relevant Medications    cephalexin (Keflex) 500 mg capsule    Other Relevant Orders    POCT UA (nonautomated) manually resulted (Completed)

## 2025-08-09 LAB — BACTERIA UR CULT: ABNORMAL

## 2025-12-15 ENCOUNTER — APPOINTMENT (OUTPATIENT)
Dept: PRIMARY CARE | Facility: CLINIC | Age: 85
End: 2025-12-15
Payer: MEDICARE